# Patient Record
Sex: FEMALE | Race: WHITE | NOT HISPANIC OR LATINO
[De-identification: names, ages, dates, MRNs, and addresses within clinical notes are randomized per-mention and may not be internally consistent; named-entity substitution may affect disease eponyms.]

---

## 2018-06-19 ENCOUNTER — RESULT REVIEW (OUTPATIENT)
Age: 58
End: 2018-06-19

## 2020-04-11 ENCOUNTER — INPATIENT (INPATIENT)
Facility: HOSPITAL | Age: 60
LOS: 2 days | Discharge: HOME | DRG: 175 | End: 2020-04-14
Attending: FAMILY MEDICINE | Admitting: HOSPITALIST
Payer: COMMERCIAL

## 2020-04-11 VITALS
DIASTOLIC BLOOD PRESSURE: 75 MMHG | TEMPERATURE: 99 F | RESPIRATION RATE: 24 BRPM | HEIGHT: 63 IN | OXYGEN SATURATION: 94 % | SYSTOLIC BLOOD PRESSURE: 146 MMHG | WEIGHT: 166.01 LBS | HEART RATE: 114 BPM

## 2020-04-11 DIAGNOSIS — I82.402 ACUTE EMBOLISM AND THROMBOSIS OF UNSPECIFIED DEEP VEINS OF LEFT LOWER EXTREMITY: ICD-10-CM

## 2020-04-11 LAB
ALBUMIN SERPL ELPH-MCNC: 3.3 G/DL — SIGNIFICANT CHANGE UP (ref 3.3–5.2)
ALP SERPL-CCNC: 84 U/L — SIGNIFICANT CHANGE UP (ref 40–120)
ALT FLD-CCNC: 30 U/L — SIGNIFICANT CHANGE UP
ANION GAP SERPL CALC-SCNC: 13 MMOL/L — SIGNIFICANT CHANGE UP (ref 5–17)
APTT BLD: 29.9 SEC — SIGNIFICANT CHANGE UP (ref 27.5–36.3)
AST SERPL-CCNC: 24 U/L — SIGNIFICANT CHANGE UP
BASOPHILS # BLD AUTO: 0.05 K/UL — SIGNIFICANT CHANGE UP (ref 0–0.2)
BASOPHILS NFR BLD AUTO: 0.4 % — SIGNIFICANT CHANGE UP (ref 0–2)
BILIRUB SERPL-MCNC: 0.5 MG/DL — SIGNIFICANT CHANGE UP (ref 0.4–2)
BUN SERPL-MCNC: 10 MG/DL — SIGNIFICANT CHANGE UP (ref 8–20)
CALCIUM SERPL-MCNC: 9 MG/DL — SIGNIFICANT CHANGE UP (ref 8.6–10.2)
CHLORIDE SERPL-SCNC: 98 MMOL/L — SIGNIFICANT CHANGE UP (ref 98–107)
CO2 SERPL-SCNC: 28 MMOL/L — SIGNIFICANT CHANGE UP (ref 22–29)
CREAT SERPL-MCNC: 0.74 MG/DL — SIGNIFICANT CHANGE UP (ref 0.5–1.3)
CRP SERPL-MCNC: 30.85 MG/DL — HIGH (ref 0–0.4)
D DIMER BLD IA.RAPID-MCNC: 3838 NG/ML DDU — HIGH
EOSINOPHIL # BLD AUTO: 0.09 K/UL — SIGNIFICANT CHANGE UP (ref 0–0.5)
EOSINOPHIL NFR BLD AUTO: 0.7 % — SIGNIFICANT CHANGE UP (ref 0–6)
FERRITIN SERPL-MCNC: 1109 NG/ML — HIGH (ref 15–150)
FIBRINOGEN PPP-MCNC: >1400 MG/DL — CRITICAL HIGH (ref 300–520)
GLUCOSE SERPL-MCNC: 98 MG/DL — SIGNIFICANT CHANGE UP (ref 70–99)
HCT VFR BLD CALC: 38.9 % — SIGNIFICANT CHANGE UP (ref 34.5–45)
HGB BLD-MCNC: 12.4 G/DL — SIGNIFICANT CHANGE UP (ref 11.5–15.5)
IMM GRANULOCYTES NFR BLD AUTO: 1.2 % — SIGNIFICANT CHANGE UP (ref 0–1.5)
INR BLD: 1.38 RATIO — HIGH (ref 0.88–1.16)
LDH SERPL L TO P-CCNC: 236 U/L — HIGH (ref 98–192)
LYMPHOCYTES # BLD AUTO: 1.2 K/UL — SIGNIFICANT CHANGE UP (ref 1–3.3)
LYMPHOCYTES # BLD AUTO: 9.3 % — LOW (ref 13–44)
MCHC RBC-ENTMCNC: 27.7 PG — SIGNIFICANT CHANGE UP (ref 27–34)
MCHC RBC-ENTMCNC: 31.9 GM/DL — LOW (ref 32–36)
MCV RBC AUTO: 86.8 FL — SIGNIFICANT CHANGE UP (ref 80–100)
MONOCYTES # BLD AUTO: 1.33 K/UL — HIGH (ref 0–0.9)
MONOCYTES NFR BLD AUTO: 10.3 % — SIGNIFICANT CHANGE UP (ref 2–14)
NEUTROPHILS # BLD AUTO: 10.12 K/UL — HIGH (ref 1.8–7.4)
NEUTROPHILS NFR BLD AUTO: 78.1 % — HIGH (ref 43–77)
NT-PROBNP SERPL-SCNC: 57 PG/ML — SIGNIFICANT CHANGE UP (ref 0–300)
PLATELET # BLD AUTO: 309 K/UL — SIGNIFICANT CHANGE UP (ref 150–400)
POTASSIUM SERPL-MCNC: 3.3 MMOL/L — LOW (ref 3.5–5.3)
POTASSIUM SERPL-SCNC: 3.3 MMOL/L — LOW (ref 3.5–5.3)
PROCALCITONIN SERPL-MCNC: 0.1 NG/ML — SIGNIFICANT CHANGE UP (ref 0.02–0.1)
PROT SERPL-MCNC: 7.3 G/DL — SIGNIFICANT CHANGE UP (ref 6.6–8.7)
PROTHROM AB SERPL-ACNC: 15.7 SEC — HIGH (ref 10–12.9)
RBC # BLD: 4.48 M/UL — SIGNIFICANT CHANGE UP (ref 3.8–5.2)
RBC # FLD: 13.2 % — SIGNIFICANT CHANGE UP (ref 10.3–14.5)
SODIUM SERPL-SCNC: 139 MMOL/L — SIGNIFICANT CHANGE UP (ref 135–145)
TROPONIN T SERPL-MCNC: <0.01 NG/ML — SIGNIFICANT CHANGE UP (ref 0–0.06)
WBC # BLD: 12.94 K/UL — HIGH (ref 3.8–10.5)
WBC # FLD AUTO: 12.94 K/UL — HIGH (ref 3.8–10.5)

## 2020-04-11 PROCEDURE — 99285 EMERGENCY DEPT VISIT HI MDM: CPT

## 2020-04-11 PROCEDURE — 99223 1ST HOSP IP/OBS HIGH 75: CPT

## 2020-04-11 PROCEDURE — 71045 X-RAY EXAM CHEST 1 VIEW: CPT | Mod: 26

## 2020-04-11 PROCEDURE — 71275 CT ANGIOGRAPHY CHEST: CPT | Mod: 26

## 2020-04-11 PROCEDURE — 93010 ELECTROCARDIOGRAM REPORT: CPT

## 2020-04-11 RX ORDER — HEPARIN SODIUM 5000 [USP'U]/ML
6000 INJECTION INTRAVENOUS; SUBCUTANEOUS ONCE
Refills: 0 | Status: DISCONTINUED | OUTPATIENT
Start: 2020-04-11 | End: 2020-04-11

## 2020-04-11 RX ORDER — HEPARIN SODIUM 5000 [USP'U]/ML
3000 INJECTION INTRAVENOUS; SUBCUTANEOUS EVERY 6 HOURS
Refills: 0 | Status: DISCONTINUED | OUTPATIENT
Start: 2020-04-11 | End: 2020-04-13

## 2020-04-11 RX ORDER — HEPARIN SODIUM 5000 [USP'U]/ML
3000 INJECTION INTRAVENOUS; SUBCUTANEOUS EVERY 6 HOURS
Refills: 0 | Status: DISCONTINUED | OUTPATIENT
Start: 2020-04-11 | End: 2020-04-11

## 2020-04-11 RX ORDER — HEPARIN SODIUM 5000 [USP'U]/ML
INJECTION INTRAVENOUS; SUBCUTANEOUS
Qty: 25000 | Refills: 0 | Status: DISCONTINUED | OUTPATIENT
Start: 2020-04-11 | End: 2020-04-13

## 2020-04-11 RX ORDER — HEPARIN SODIUM 5000 [USP'U]/ML
6500 INJECTION INTRAVENOUS; SUBCUTANEOUS ONCE
Refills: 0 | Status: COMPLETED | OUTPATIENT
Start: 2020-04-11 | End: 2020-04-11

## 2020-04-11 RX ORDER — HEPARIN SODIUM 5000 [USP'U]/ML
6500 INJECTION INTRAVENOUS; SUBCUTANEOUS EVERY 6 HOURS
Refills: 0 | Status: DISCONTINUED | OUTPATIENT
Start: 2020-04-11 | End: 2020-04-13

## 2020-04-11 RX ORDER — HEPARIN SODIUM 5000 [USP'U]/ML
6000 INJECTION INTRAVENOUS; SUBCUTANEOUS EVERY 6 HOURS
Refills: 0 | Status: DISCONTINUED | OUTPATIENT
Start: 2020-04-11 | End: 2020-04-11

## 2020-04-11 RX ORDER — HEPARIN SODIUM 5000 [USP'U]/ML
INJECTION INTRAVENOUS; SUBCUTANEOUS
Qty: 25000 | Refills: 0 | Status: DISCONTINUED | OUTPATIENT
Start: 2020-04-11 | End: 2020-04-11

## 2020-04-11 RX ADMIN — HEPARIN SODIUM 6500 UNIT(S): 5000 INJECTION INTRAVENOUS; SUBCUTANEOUS at 22:21

## 2020-04-11 RX ADMIN — HEPARIN SODIUM 1400 UNIT(S)/HR: 5000 INJECTION INTRAVENOUS; SUBCUTANEOUS at 22:22

## 2020-04-11 NOTE — ED ADULT NURSE NOTE - NSIMPLEMENTINTERV_GEN_ALL_ED
Implemented All Fall Risk Interventions:  [place] to call system. Call bell, personal items and telephone within reach. Instruct patient to call for assistance. Room bathroom lighting operational. Non-slip footwear when patient is off stretcher. Physically safe environment: no spills, clutter or unnecessary equipment. Stretcher in lowest position, wheels locked, appropriate side rails in place. Provide visual cue, wrist band, yellow gown, etc. Monitor gait and stability. Monitor for mental status changes and reorient to person, place, and time. Review medications for side effects contributing to fall risk. Reinforce activity limits and safety measures with patient and family.

## 2020-04-11 NOTE — ED PROVIDER NOTE - OBJECTIVE STATEMENT
61 yo female pmh exercise induced asthma comes to ed with 2 week history of cough ,fever and shortness of breath; pt noted last week with low oxygen saturation txed with zithromax, nebs, and steroids; pt went to clinic with pain in left lower leg noted to be positive for dvt; covid swab pending

## 2020-04-11 NOTE — ED ADULT NURSE NOTE - OBJECTIVE STATEMENT
pt reports went to urgent care today, due to today being her 20th day of flu-like symptoms, trouble breathing, fevers max T 103.3 yesterday, cough, body aches and chills. at urgent care had CXR showed bilat PNA, also had ultrasound at [facility] due to pain and swelling to left lower leg, showing left leg DVT. was swabbed for covid today.  currently with same symptoms, son experienced similar symptoms a month ago.

## 2020-04-11 NOTE — ED PROVIDER NOTE - CARE PLAN
Principal Discharge DX:	Acute deep vein thrombosis (DVT) of left lower extremity, unspecified vein Principal Discharge DX:	Acute deep vein thrombosis (DVT) of left lower extremity, unspecified vein  Secondary Diagnosis:	Other pulmonary embolism without acute cor pulmonale, unspecified chronicity

## 2020-04-11 NOTE — ED ADULT TRIAGE NOTE - CHIEF COMPLAINT QUOTE
c/o  left leg dvt, also has a cough, fever,  had corona test today, had chest xray ,. pt was on 2 rounds of z pack not better

## 2020-04-12 DIAGNOSIS — I26.99 OTHER PULMONARY EMBOLISM WITHOUT ACUTE COR PULMONALE: ICD-10-CM

## 2020-04-12 LAB
APPEARANCE UR: CLEAR — SIGNIFICANT CHANGE UP
APTT BLD: 54.8 SEC — HIGH (ref 27.5–36.3)
APTT BLD: 70.9 SEC — HIGH (ref 27.5–36.3)
APTT BLD: 73.5 SEC — HIGH (ref 27.5–36.3)
BACTERIA # UR AUTO: ABNORMAL
BASOPHILS # BLD AUTO: 0.05 K/UL — SIGNIFICANT CHANGE UP (ref 0–0.2)
BASOPHILS NFR BLD AUTO: 0.4 % — SIGNIFICANT CHANGE UP (ref 0–2)
BILIRUB UR-MCNC: NEGATIVE — SIGNIFICANT CHANGE UP
CHOLEST SERPL-MCNC: 124 MG/DL — SIGNIFICANT CHANGE UP (ref 110–199)
CK SERPL-CCNC: 52 U/L — SIGNIFICANT CHANGE UP (ref 25–170)
COLOR SPEC: YELLOW — SIGNIFICANT CHANGE UP
DIFF PNL FLD: NEGATIVE — SIGNIFICANT CHANGE UP
EOSINOPHIL # BLD AUTO: 0.18 K/UL — SIGNIFICANT CHANGE UP (ref 0–0.5)
EOSINOPHIL NFR BLD AUTO: 1.5 % — SIGNIFICANT CHANGE UP (ref 0–6)
EPI CELLS # UR: ABNORMAL
GLUCOSE UR QL: NEGATIVE MG/DL — SIGNIFICANT CHANGE UP
HBA1C BLD-MCNC: 6.2 % — HIGH (ref 4–5.6)
HCT VFR BLD CALC: 35.8 % — SIGNIFICANT CHANGE UP (ref 34.5–45)
HDLC SERPL-MCNC: 43 MG/DL — LOW
HGB BLD-MCNC: 11.3 G/DL — LOW (ref 11.5–15.5)
IMM GRANULOCYTES NFR BLD AUTO: 1.5 % — SIGNIFICANT CHANGE UP (ref 0–1.5)
KETONES UR-MCNC: NEGATIVE — SIGNIFICANT CHANGE UP
LEUKOCYTE ESTERASE UR-ACNC: ABNORMAL
LIPID PNL WITH DIRECT LDL SERPL: 56 MG/DL — SIGNIFICANT CHANGE UP
LYMPHOCYTES # BLD AUTO: 1.93 K/UL — SIGNIFICANT CHANGE UP (ref 1–3.3)
LYMPHOCYTES # BLD AUTO: 16.4 % — SIGNIFICANT CHANGE UP (ref 13–44)
MAGNESIUM SERPL-MCNC: 2.3 MG/DL — SIGNIFICANT CHANGE UP (ref 1.6–2.6)
MCHC RBC-ENTMCNC: 27.1 PG — SIGNIFICANT CHANGE UP (ref 27–34)
MCHC RBC-ENTMCNC: 31.6 GM/DL — LOW (ref 32–36)
MCV RBC AUTO: 85.9 FL — SIGNIFICANT CHANGE UP (ref 80–100)
MONOCYTES # BLD AUTO: 1.5 K/UL — HIGH (ref 0–0.9)
MONOCYTES NFR BLD AUTO: 12.8 % — SIGNIFICANT CHANGE UP (ref 2–14)
NEUTROPHILS # BLD AUTO: 7.9 K/UL — HIGH (ref 1.8–7.4)
NEUTROPHILS NFR BLD AUTO: 67.4 % — SIGNIFICANT CHANGE UP (ref 43–77)
NITRITE UR-MCNC: NEGATIVE — SIGNIFICANT CHANGE UP
NT-PROBNP SERPL-SCNC: 66 PG/ML — SIGNIFICANT CHANGE UP (ref 0–300)
PH UR: 6 — SIGNIFICANT CHANGE UP (ref 5–8)
PHOSPHATE SERPL-MCNC: 3.8 MG/DL — SIGNIFICANT CHANGE UP (ref 2.4–4.7)
PLATELET # BLD AUTO: 339 K/UL — SIGNIFICANT CHANGE UP (ref 150–400)
PROT UR-MCNC: 30 MG/DL
RBC # BLD: 4.17 M/UL — SIGNIFICANT CHANGE UP (ref 3.8–5.2)
RBC # FLD: 13.3 % — SIGNIFICANT CHANGE UP (ref 10.3–14.5)
RBC CASTS # UR COMP ASSIST: SIGNIFICANT CHANGE UP /HPF (ref 0–4)
SARS-COV-2 RNA SPEC QL NAA+PROBE: SIGNIFICANT CHANGE UP
SP GR SPEC: 1.01 — SIGNIFICANT CHANGE UP (ref 1.01–1.02)
TOTAL CHOLESTEROL/HDL RATIO MEASUREMENT: 3 RATIO — LOW (ref 3.3–7.1)
TRIGL SERPL-MCNC: 126 MG/DL — SIGNIFICANT CHANGE UP (ref 10–200)
TROPONIN T SERPL-MCNC: <0.01 NG/ML — SIGNIFICANT CHANGE UP (ref 0–0.06)
TROPONIN T SERPL-MCNC: <0.01 NG/ML — SIGNIFICANT CHANGE UP (ref 0–0.06)
TSH SERPL-MCNC: 0.68 UIU/ML — SIGNIFICANT CHANGE UP (ref 0.27–4.2)
UROBILINOGEN FLD QL: NEGATIVE MG/DL — SIGNIFICANT CHANGE UP
WBC # BLD: 11.74 K/UL — HIGH (ref 3.8–10.5)
WBC # FLD AUTO: 11.74 K/UL — HIGH (ref 3.8–10.5)
WBC UR QL: SIGNIFICANT CHANGE UP

## 2020-04-12 PROCEDURE — 93970 EXTREMITY STUDY: CPT | Mod: 26

## 2020-04-12 PROCEDURE — 12345: CPT | Mod: NC

## 2020-04-12 PROCEDURE — 93010 ELECTROCARDIOGRAM REPORT: CPT

## 2020-04-12 RX ORDER — ASCORBIC ACID 60 MG
500 TABLET,CHEWABLE ORAL THREE TIMES A DAY
Refills: 0 | Status: DISCONTINUED | OUTPATIENT
Start: 2020-04-12 | End: 2020-04-14

## 2020-04-12 RX ORDER — HYDROXYCHLOROQUINE SULFATE 200 MG
TABLET ORAL
Refills: 0 | Status: DISCONTINUED | OUTPATIENT
Start: 2020-04-12 | End: 2020-04-14

## 2020-04-12 RX ORDER — POTASSIUM CHLORIDE 20 MEQ
40 PACKET (EA) ORAL EVERY 4 HOURS
Refills: 0 | Status: COMPLETED | OUTPATIENT
Start: 2020-04-12 | End: 2020-04-12

## 2020-04-12 RX ORDER — ACETAMINOPHEN 500 MG
650 TABLET ORAL EVERY 6 HOURS
Refills: 0 | Status: DISCONTINUED | OUTPATIENT
Start: 2020-04-12 | End: 2020-04-14

## 2020-04-12 RX ORDER — ALBUTEROL 90 UG/1
3 AEROSOL, METERED ORAL
Qty: 0 | Refills: 0 | DISCHARGE

## 2020-04-12 RX ORDER — THIAMINE MONONITRATE (VIT B1) 100 MG
200 TABLET ORAL
Refills: 0 | Status: DISCONTINUED | OUTPATIENT
Start: 2020-04-12 | End: 2020-04-14

## 2020-04-12 RX ORDER — ALBUTEROL 90 UG/1
2 AEROSOL, METERED ORAL EVERY 6 HOURS
Refills: 0 | Status: DISCONTINUED | OUTPATIENT
Start: 2020-04-12 | End: 2020-04-14

## 2020-04-12 RX ORDER — HYDROXYCHLOROQUINE SULFATE 200 MG
400 TABLET ORAL EVERY 24 HOURS
Refills: 0 | Status: DISCONTINUED | OUTPATIENT
Start: 2020-04-13 | End: 2020-04-14

## 2020-04-12 RX ORDER — HYDROXYCHLOROQUINE SULFATE 200 MG
800 TABLET ORAL EVERY 24 HOURS
Refills: 0 | Status: COMPLETED | OUTPATIENT
Start: 2020-04-12 | End: 2020-04-12

## 2020-04-12 RX ADMIN — HEPARIN SODIUM 1600 UNIT(S)/HR: 5000 INJECTION INTRAVENOUS; SUBCUTANEOUS at 21:20

## 2020-04-12 RX ADMIN — Medication 40 MILLIEQUIVALENT(S): at 02:24

## 2020-04-12 RX ADMIN — Medication 500 MILLIGRAM(S): at 13:51

## 2020-04-12 RX ADMIN — Medication 100 MILLIGRAM(S): at 05:40

## 2020-04-12 RX ADMIN — Medication 650 MILLIGRAM(S): at 05:40

## 2020-04-12 RX ADMIN — HEPARIN SODIUM 3000 UNIT(S): 5000 INJECTION INTRAVENOUS; SUBCUTANEOUS at 06:48

## 2020-04-12 RX ADMIN — Medication 500 MILLIGRAM(S): at 21:59

## 2020-04-12 RX ADMIN — Medication 100 MILLIGRAM(S): at 13:51

## 2020-04-12 RX ADMIN — Medication 100 MILLIGRAM(S): at 21:59

## 2020-04-12 RX ADMIN — Medication 800 MILLIGRAM(S): at 02:24

## 2020-04-12 RX ADMIN — Medication 40 MILLIEQUIVALENT(S): at 05:41

## 2020-04-12 RX ADMIN — Medication 100 MILLIGRAM(S): at 19:03

## 2020-04-12 RX ADMIN — HEPARIN SODIUM 1600 UNIT(S)/HR: 5000 INJECTION INTRAVENOUS; SUBCUTANEOUS at 06:47

## 2020-04-12 RX ADMIN — Medication 40 MILLIGRAM(S): at 19:02

## 2020-04-12 RX ADMIN — Medication 40 MILLIGRAM(S): at 05:40

## 2020-04-12 NOTE — H&P ADULT - REASON FOR ADMISSION
Acute Hypoxemic Respiratory Failure 2/2 suspected COVID19 PNA and multiple acute bilateral PEs and acute LLE DVT

## 2020-04-12 NOTE — ED ADULT NURSE REASSESSMENT NOTE - NS ED NURSE REASSESS COMMENT FT1
Report received from off going RN, pt received sleeping on stretcher in semi fowlers position, IV heparin gtt @ 1600 units/hr  infusing via alaris pump. per repeated labs at 6am, pt denies any c/o sob or chest pain  arousable to voice. v.s.s..

## 2020-04-12 NOTE — ED ADULT NURSE REASSESSMENT NOTE - NS ED NURSE REASSESS COMMENT FT1
NO lab present to draw repeat ptt, labs drawn by RN and sent. pt ria it well. pt ria lunch tray well. denies any chest pain or sob, IV heparin gtt continues per protocol. voiding via bedpan without difficulty, pericare provided. pt ria it well.

## 2020-04-12 NOTE — PROGRESS NOTE ADULT - ASSESSMENT
Acute Hypoxemic Respiratory Failure 2/2 multiple Acute Bilateral PE (s) s and COVID19 PNA  -cardiac enzymes negative   -trend D-Dimers/Trops/CPKs/CRP/ESR, elevated, monitor every 48-72 hrs for Acute PE/COVID PNA  -repeat EKG to monitor QTc and RV strain; -continue O2 via NC titrate FIO2 >92% and <96%  -Methylprednisolone 40mg IV q12 for COVID  -s/p Azithromycin completed x2 as outpatient  -Hydroxychloroquine 800mg x1 then 400mg q24 per protocol  -EKG Sinus Tachcyardia with QTc 433 no RV strain; repeat EKG QTc<500  -CTA findings as above. No RV strain.  -Check BL LE Venous Duplex to evaluate clot burden.  -2dEcho for RV; pending  -Continue Heparin bolus/gtt per protocol  -Defer AC to Pulm. Likely switch to NOAC in AM, agree with NOAC, will require NOAC for 6 months -Pt has out of state insurance  -continue heparin drip for now, monitor O2 need/tachycardia  -recommended f/u duplex in 3 months; CT chest in 6 wks    LLE DVT  -outpatient positive Venous duplex 4/11 at [facility] only LLE performed  -check BL LE Venous Dopplers inpatient to assess clot burden  -Consider Vascular Surgery consult for clot retrieval as pt continues to experience pain with ambulation  -will need outpt work up for thrombophilia    Hypokalemia  -KCL 40meq PO q4 x2. Repeat labs. Check Mg/Phos level    Exercise-Induced Asthma  -Albuterol MDI PRN    Incidental L Adrenal Gland lesion on CTA  -outpatient F/u for MRI Abd/Endocrinology     VTE  -Heparin drip     Dispo:  -Home     Follow up:  PCP, Hematology, Endocrinology    Code Status:  Full Code     (Lalo Perez) contacted and updated concerning pt status and plan of care. Attest understanding, agree with plan. All questions answered.

## 2020-04-12 NOTE — PROGRESS NOTE ADULT - SUBJECTIVE AND OBJECTIVE BOX
Patient is a 60y old  Female who presents with a chief complaint of Acute Hypoxemic Respiratory Failure 2/2 suspected COVID19 PNA and multiple acute bilateral PEs and acute LLE DVT (2020 11:27)    INTERVAL HPI/OVERNIGHT EVENTS:  60y  Female  Admitted overnight due to DVT/PEs. Currently on heparin drip and O2.   Pt is tolerating PO, voiding and stooling spontaneously. Resting in bed. Pain well tolerated under current regiment.  ROS Negative except as above.    Vital Signs Last 24 Hrs  T(C): 36.9 (2020 16:34), Max: 37.7 (2020 18:22)  T(F): 98.4 (2020 16:34), Max: 99.9 (2020 18:22)  HR: 82 (2020 16:34) (82 - 103)  BP: 101/66 (2020 16:34) (94/52 - 129/64)  RR: 22 (2020 16:34) (17 - 24)  SpO2: 95% (2020 16:34) (88% - 97%)    Telemetry: 95% O2 sat on NC 2lts    PHYSICAL EXAM:  General: NAD, well nourished and well developed, afebrile, on NC  HEENT: Normocephalic, atraumatic, clear sclera, PERRLA, EOMI, Clear nares, Moist Mucosas  Neck: Supple, no JVD  Respiratory: Normal respiratory rate and effort, lungs are clear to auscultation bilaterally.  Cardiac: Regular rate and rhythm, no murmurs, rubs or gallop.  GI: Abdomen is soft, nontender, nondistended, no rebound or guarding, bowel sounds are normal.  Extremities: No cyanosis, no edema, pedal pulses +2 bilaterally. Andrea Sign negative.  Neurological: Patient is alert and oriented x4, CN II-XII grossly intact, no sensory or motor deficits.  Psych: Normal speech and affect, good eye contact. No behavioural disturbances.    LABS:             11.3   11. )-----------( 339      ( 2020 05:03 )             35.8     CBC Full  -  ( 2020 05:03 )  WBC Count : 11.74 K/uL  RBC Count : 4.17 M/uL  Hemoglobin : 11.3 g/dL  Hematocrit : 35.8 %  Platelet Count - Automated : 339 K/uL  Mean Cell Volume : 85.9 fl  Mean Cell Hemoglobin : 27.1 pg  Mean Cell Hemoglobin Concentration : 31.6 gm/dL  Auto Neutrophil # : 7.90 K/uL  Auto Lymphocyte # : 1.93 K/uL  Auto Monocyte # : 1.50 K/uL  Auto Eosinophil # : 0.18 K/uL  Auto Basophil # : 0.05 K/uL  Auto Neutrophil % : 67.4 %  Auto Lymphocyte % : 16.4 %  Auto Monocyte % : 12.8 %  Auto Eosinophil % : 1.5 %  Auto Basophil % : 0.4 %      139  |  98  |  10.0  ----------------------------<  98  3.3<L>   |  28.0  |  0.74    Ca    9.0      2020 16:10  Phos  3.8       Mg     2.3     12    TPro  7.3  /  Alb  3.3  /  TBili  0.5  /  DBili  x   /  AST  24  /  ALT  30  /  AlkPhos  84  04-11      PT/INR - ( 2020 16:10 )   PT: 15.7 sec;   INR: 1.38 ratio    PTT - ( 2020 14:04 )  PTT:73.5 sec    Urinalysis Basic - ( 2020 17:02 )  Color: Yellow / Appearance: Clear / S.015 / pH: x  Gluc: x / Ketone: Negative  / Bili: Negative / Urobili: Negative mg/dL   Blood: x / Protein: 30 mg/dL / Nitrite: Negative   Leuk Esterase: Trace / RBC: x / WBC x   Sq Epi: x / Non Sq Epi: x / Bacteria: x    Hemoglobin A1C, Whole Blood: 6.2 % ( @ 05:03)    Serum Pro-Brain Natriuretic Peptide: 66 pg/mL ( @ 07:03)  Procalcitonin, Serum: 0.10 ng/mL ( @ 16:10)  Serum Pro-Brain Natriuretic Peptide: 57 pg/mL ( @ 16:10)    < from: US Duplex Venous Lower Ext Complete, Bilateral (20 @ 07:46) >  FINDINGS:  There is normal compressibility of the right common femoral, femoral and popliteal veins.   Doppler examination shows normal spontaneous and phasic flow.  No right calf vein thrombosis is detected.  Thrombus noted throughout the deep veins of the left lower extremity.    IMPRESSION:   Extensive deep venous thrombosis throughout the left lower extremity.    CONSTANCE GARCIA M.D., ATTENDING RADIOLOGIST  This document has been electronically signed. 2020  8:08AM  < end of copied text >    < from: CT Angio Chest w/ IV Cont (20 @ 19:13) >  FINDINGS:  LUNGS AND AIRWAYS: Patent central airways.  There is patchy groundglass opacity and areas of more dense lung consolidation bilaterally, which has a predominant peripheral distribution..  PLEURA: No pleural effusion.  MEDIASTINUM AND YULISA: No lymphadenopathy.  VESSELS: There is lobar, segmental and subsegmental pulmonary emboli in the right upper, right middle and right lower lobes.  There are segmental and subsegmental pulmonary in the left upper lobe and lingula.  HEART: Heart size is normal.  No dilatation of the right ventricle or flattening of the interventricular septum. No pericardial effusion.  CHEST WALL AND LOWER NECK: Within normal limits.  VISUALIZED UPPER ABDOMEN: There is approximately 2.5 x 2.4 cm indeterminate lesion in the left adrenal gland.  BONES: Within normal limits.    IMPRESSION:   Acute pulmonary emboli bilaterally.  No evidence of right heart strain.  Patchy peripheral opacities in both lungs compatible, compatible with COVID-19 pneumonia.  Approximately 2.5 x 2.4 cm indeterminant left adrenal gland lesion.  If this has not been characterized, abdominal MRI should be obtained on a nonemergent basis.    CRITICAL VALUE: I discussed the major findings in this report with  Dr. Mcleod on 2020 at 7:40 PM.  Critical value policy of the hospital was followed. Read back and confirmation of receipt of this communication was performed. This verbal communication supplements the text report of this document.    HYACINTH WAYNE M.D.,ATTENDING RADIOLOG  < end of copied text >    MEDICATIONS  (STANDING):  ascorbic acid 500 milliGRAM(s) Oral three times a day  benzonatate 100 milliGRAM(s) Oral three times a day  heparin  Infusion.  Unit(s)/Hr (14 mL/Hr) IV Continuous <Continuous>  hydroxychloroquine   Oral   methylPREDNISolone sodium succinate Injectable 40 milliGRAM(s) IV Push every 12 hours  thiamine 200 milliGRAM(s) Oral <User Schedule>    MEDICATIONS  (PRN):  acetaminophen    Suspension .. 650 milliGRAM(s) Oral every 6 hours PRN Temp greater or equal to 38C (100.4F), Mild Pain (1 - 3), Moderate Pain (4 - 6), Severe Pain (7 - 10)  ALBUTerol    90 MICROgram(s) HFA Inhaler 2 Puff(s) Inhalation every 6 hours PRN Shortness of Breath and/or Wheezing  guaiFENesin  milliGRAM(s) Oral every 12 hours PRN Cough  heparin  Injectable 6500 Unit(s) IV Push every 6 hours PRN For aPTT less than 40  heparin  Injectable 3000 Unit(s) IV Push every 6 hours PRN For aPTT between 40 - 57 Patient is a 60y old  Female who presents with a chief complaint of Acute Hypoxemic Respiratory Failure 2/2 suspected COVID19 PNA and multiple acute bilateral PEs and acute LLE DVT (2020 11:27)    INTERVAL HPI/OVERNIGHT EVENTS:  60y  Female  Admitted overnight due to DVT/PEs. Currently on heparin drip and O2.   Pt is tolerating PO, voiding and stooling spontaneously. Resting in bed. Pain well tolerated under current regiment.  ROS Negative except as above.    Vital Signs Last 24 Hrs  T(C): 36.9 (2020 16:34), Max: 37.7 (2020 18:22)  T(F): 98.4 (2020 16:34), Max: 99.9 (2020 18:22)  HR: 82 (2020 16:34) (82 - 103)  BP: 101/66 (2020 16:34) (94/52 - 129/64)  RR: 22 (2020 16:34) (17 - 24)  SpO2: 95% (2020 16:34) (88% - 97%)    Telemetry: 95% O2 sat on NC 2lts    PHYSICAL EXAM:  General: NAD, well nourished and well developed, afebrile, on NC  HEENT: Normocephalic, atraumatic, clear sclera, PERRLA, EOMI, Clear nares, Moist Mucosas  Neck: Supple, no JVD  Respiratory: Normal respiratory rate and effort, lungs are clear to auscultation bilaterally.  Cardiac: Regular rate and rhythm, no murmurs, rubs or gallop.  GI: Abdomen is soft, nontender, nondistended, no rebound or guarding, bowel sounds are normal.  Extremities: No cyanosis, no edema, pedal pulses +2 bilaterally. Andrea Sign negative.  Neurological: Patient is alert and oriented x4, CN II-XII grossly intact, no sensory or motor deficits.  Psych: Normal speech and affect, good eye contact. No behavioural disturbances.    LABS:             11.3   11. )-----------( 339      ( 2020 05:03 )             35.8     CBC Full  -  ( 2020 05:03 )  WBC Count : 11.74 K/uL  RBC Count : 4.17 M/uL  Hemoglobin : 11.3 g/dL  Hematocrit : 35.8 %  Platelet Count - Automated : 339 K/uL  Mean Cell Volume : 85.9 fl  Mean Cell Hemoglobin : 27.1 pg  Mean Cell Hemoglobin Concentration : 31.6 gm/dL  Auto Neutrophil # : 7.90 K/uL  Auto Lymphocyte # : 1.93 K/uL  Auto Monocyte # : 1.50 K/uL  Auto Eosinophil # : 0.18 K/uL  Auto Basophil # : 0.05 K/uL  Auto Neutrophil % : 67.4 %  Auto Lymphocyte % : 16.4 %  Auto Monocyte % : 12.8 %  Auto Eosinophil % : 1.5 %  Auto Basophil % : 0.4 %      139  |  98  |  10.0  ----------------------------<  98  3.3<L>   |  28.0  |  0.74    Ca    9.0      2020 16:10  Phos  3.8       Mg     2.3     12    TPro  7.3  /  Alb  3.3  /  TBili  0.5  /  DBili  x   /  AST  24  /  ALT  30  /  AlkPhos  84  04-11      PT/INR - ( 2020 16:10 )   PT: 15.7 sec;   INR: 1.38 ratio    PTT - ( 2020 14:04 )  PTT:73.5 sec    Urinalysis Basic - ( 2020 17:02 )  Color: Yellow / Appearance: Clear / S.015 / pH: x  Gluc: x / Ketone: Negative  / Bili: Negative / Urobili: Negative mg/dL   Blood: x / Protein: 30 mg/dL / Nitrite: Negative   Leuk Esterase: Trace / RBC: x / WBC x   Sq Epi: x / Non Sq Epi: x / Bacteria: x    Hemoglobin A1C, Whole Blood: 6.2 % ( @ 05:03)    Serum Pro-Brain Natriuretic Peptide: 66 pg/mL ( @ 07:03)  Procalcitonin, Serum: 0.10 ng/mL ( @ 16:10)  Serum Pro-Brain Natriuretic Peptide: 57 pg/mL ( @ 16:10)    < from: 12 Lead ECG (20 @ 13:19) >  Ventricular Rate 83 BPM  Atrial Rate 83 BPM  P-R Interval 164 ms  QRS Duration 80 ms  Q-T Interval 404 ms  QTC Calculation(Bezet) 474 ms  P Axis 53 degrees  R Axis 38 degrees  T Axis 34 degrees    Diagnosis Line Normal sinus rhythm  Cannot rule out Anterior infarct , age undetermined  Abnormal ECG    Confirmed by PETER MCFADDEN ([id number]) on 2020 1:49:28 PM  < end of copied text >    < from: US Duplex Venous Lower Ext Complete, Bilateral (20 @ 07:46) >  FINDINGS:  There is normal compressibility of the right common femoral, femoral and popliteal veins.   Doppler examination shows normal spontaneous and phasic flow.  No right calf vein thrombosis is detected.  Thrombus noted throughout the deep veins of the left lower extremity.    IMPRESSION:   Extensive deep venous thrombosis throughout the left lower extremity.    CONSTANCE GARCIA M.D., ATTENDING RADIOLOGIST  This document has been electronically signed. 2020  8:08AM  < end of copied text >    < from: CT Angio Chest w/ IV Cont (20 @ 19:13) >  FINDINGS:  LUNGS AND AIRWAYS: Patent central airways.  There is patchy groundglass opacity and areas of more dense lung consolidation bilaterally, which has a predominant peripheral distribution..  PLEURA: No pleural effusion.  MEDIASTINUM AND YULISA: No lymphadenopathy.  VESSELS: There is lobar, segmental and subsegmental pulmonary emboli in the right upper, right middle and right lower lobes.  There are segmental and subsegmental pulmonary in the left upper lobe and lingula.  HEART: Heart size is normal.  No dilatation of the right ventricle or flattening of the interventricular septum. No pericardial effusion.  CHEST WALL AND LOWER NECK: Within normal limits.  VISUALIZED UPPER ABDOMEN: There is approximately 2.5 x 2.4 cm indeterminate lesion in the left adrenal gland.  BONES: Within normal limits.    IMPRESSION:   Acute pulmonary emboli bilaterally.  No evidence of right heart strain.  Patchy peripheral opacities in both lungs compatible, compatible with COVID-19 pneumonia.  Approximately 2.5 x 2.4 cm indeterminant left adrenal gland lesion.  If this has not been characterized, abdominal MRI should be obtained on a nonemergent basis.    CRITICAL VALUE: I discussed the major findings in this report with  Dr. Mcleod on 2020 at 7:40 PM.  Critical value policy of the hospital was followed. Read back and confirmation of receipt of this communication was performed. This verbal communication supplements the text report of this document.    HYACINTH WAYNE M.D.,ATTENDING RADIOLOG  < end of copied text >    MEDICATIONS  (STANDING):  ascorbic acid 500 milliGRAM(s) Oral three times a day  benzonatate 100 milliGRAM(s) Oral three times a day  heparin  Infusion.  Unit(s)/Hr (14 mL/Hr) IV Continuous <Continuous>  hydroxychloroquine   Oral   methylPREDNISolone sodium succinate Injectable 40 milliGRAM(s) IV Push every 12 hours  thiamine 200 milliGRAM(s) Oral <User Schedule>    MEDICATIONS  (PRN):  acetaminophen    Suspension .. 650 milliGRAM(s) Oral every 6 hours PRN Temp greater or equal to 38C (100.4F), Mild Pain (1 - 3), Moderate Pain (4 - 6), Severe Pain (7 - 10)  ALBUTerol    90 MICROgram(s) HFA Inhaler 2 Puff(s) Inhalation every 6 hours PRN Shortness of Breath and/or Wheezing  guaiFENesin  milliGRAM(s) Oral every 12 hours PRN Cough  heparin  Injectable 6500 Unit(s) IV Push every 6 hours PRN For aPTT less than 40  heparin  Injectable 3000 Unit(s) IV Push every 6 hours PRN For aPTT between 40 - 57

## 2020-04-12 NOTE — H&P ADULT - NSICDXFAMHXPERTINENTNEGATIVE_GEN_A_CORE_FT
Denies family history blood clots/malignancy except for daughter -  age 30 from acute PE post-operatively.

## 2020-04-12 NOTE — H&P ADULT - ASSESSMENT
Acute Hypoxemic Respiratory Failure 2/2 multiple Acute Bilateral PE (s) s and COVID19 PNA  -Admit to Medicine  -repeat labs. trend cardiac enzymes.   -trend D-Dimers/Trops/CPKs/CRP/ESR for Acute PE/COVID PNA  -repeat EKG to monitor QTc and RV strain  -continue O2 via NC titrate FIO2 >92% and <96%  -Methylprednisolone 40mg IV q12 for COVID  -s/p Azithromycin completed x2 as outpatient  -Hydroxychloroquine 800mg x1 then 400mg q24 per protocol  -EKG Sinus Tachcyardia with QTc 433 no RV strain  -CTA findings as above. No RV strain.  -Check BL LE Venous Duplex to evaluate clot burden.  -2dEcho for RV  -Pulm Consult - LI Lung  -Continue Heparin bolus/gtt per protocol  -Defer AC to Pulm. Likely switch to NOAC in AM    LLE DVT  -outpatient positive Venous duplex 4/11 at [facility] only LLE performed  -check BL LE Venous Dopplers inpatient to assess clot burden  -Consider Vascular Surgery consult for clot retrieval as pt continues to experience pain with ambulation    Hypokalemia  -KCL 40meq PO q4 x2. Repeat labs. Check Mg/Phos level    Exercise-Induced Asthma  -Albuterol MDI PRN    Incidental L Adrenal Gland lesion on CTA  -outpatient F/u for MRI Abd/Endocrinology

## 2020-04-12 NOTE — CONSULT NOTE ADULT - SUBJECTIVE AND OBJECTIVE BOX
PULMONARY CONSULT NOTE      CAREN DISLAN-[id number]    Patient is a 60y old  Female who presents with a chief complaint of Acute Hypoxemic Respiratory Failure 2/2 suspected COVID19 PNA and multiple acute bilateral PEs and acute LLE DVT (12 Apr 2020 03:39)      INTERVAL HPI/OVERNIGHT EVENTS:59 y/o F with PMHX Exercise-induced Asthma c/o nonproductive cough, Fever/Chills, SOB/AC x2 weeks. She developed hypoxia last week and was rx'd Azithromycin, Albuterol Nebs, and steroids as outpatient. She developed LLE calf pain more recently and went to [facility] - had outpatient venous duplex doppler at [facility] with +DVT LLE. COVID19 screen pending. Pt with significant hypoxia and tachycardia on arrival. CXR with bilateral infiltrates. CTA Chest +Acute Bilateral PEs. +lobar/segmental/subsegmental PE in RUL/RML/RLL. +segmental/subsegmental PE in PARRIS/Lingula. No RV strain. +Peripheral GGO bilaterally. EKG without evidence of RV strain. Labs with D-Dimer almost 4000 and CRP >30. Started on Heparin gtt with bolus and currently on Heparin gtt. Currently afebrile but reported Tmax 103.3F yesterday. +Sick Contacts at home x2 (/son). No recent travel or extended car rides but admits to laying in bed entire day for past 2 weeks except for bathroom use twice a day due to ongoing hypoxia. ROS otherwise negative unless mentioned above.       MEDICATIONS  (STANDING):  ascorbic acid 500 milliGRAM(s) Oral three times a day  benzonatate 100 milliGRAM(s) Oral three times a day  heparin  Infusion.  Unit(s)/Hr (14 mL/Hr) IV Continuous <Continuous>  hydroxychloroquine   Oral   methylPREDNISolone sodium succinate Injectable 40 milliGRAM(s) IV Push every 12 hours  thiamine 200 milliGRAM(s) Oral <User Schedule>      MEDICATIONS  (PRN):  acetaminophen    Suspension .. 650 milliGRAM(s) Oral every 6 hours PRN Temp greater or equal to 38C (100.4F), Mild Pain (1 - 3), Moderate Pain (4 - 6), Severe Pain (7 - 10)  ALBUTerol    90 MICROgram(s) HFA Inhaler 2 Puff(s) Inhalation every 6 hours PRN Shortness of Breath and/or Wheezing  guaiFENesin  milliGRAM(s) Oral every 12 hours PRN Cough  heparin  Injectable 6500 Unit(s) IV Push every 6 hours PRN For aPTT less than 40  heparin  Injectable 3000 Unit(s) IV Push every 6 hours PRN For aPTT between 40 - 57      Allergies    penicillin (Unknown)    Intolerances        PAST MEDICAL & SURGICAL HISTORY:  Asthma  No significant past surgical history      FAMILY HISTORY:  No pertinent family history in first degree relatives      SOCIAL HISTORY  Smoking History:   never  REVIEW OF SYSTEMS:    CONSTITUTIONAL:  As per HPI.    HEENT:  Eyes:  No diplopia or blurred vision. ENT:  No earache, sore throat or runny nose.    CARDIOVASCULAR:  No pressure, squeezing, tightness, heaviness or aching about the chest; no palpitations.    RESPIRATORY:  Per HPI    GASTROINTESTINAL:  No nausea, vomiting or diarrhea.    GENITOURINARY:  No dysuria, frequency or urgency.    MUSCULOSKELETAL:  No joint pains    SKIN:  No new lesions.    NEUROLOGIC:  No paresthesias, fasciculations, seizures or weakness.    PSYCHIATRIC:  No disorder of thought or mood.    ENDOCRINE:  No heat or cold intolerance, polyuria or polydipsia.    HEMATOLOGICAL:  No easy bruising or bleeding.     Vital Signs Last 24 Hrs  T(C): 36.6 (12 Apr 2020 07:13), Max: 37.7 (11 Apr 2020 18:22)  T(F): 97.9 (12 Apr 2020 07:13), Max: 99.9 (11 Apr 2020 18:22)  HR: 90 (12 Apr 2020 07:13) (90 - 114)  BP: 94/52 (12 Apr 2020 07:13) (94/52 - 146/75)  BP(mean): --  RR: 17 (12 Apr 2020 07:13) (17 - 24)  SpO2: 97% (12 Apr 2020 07:13) (88% - 97%)    PHYSICAL EXAMINATION:      EXTREMITIES: Without any cyanosis, clubbing, rash, lesions or edema.  LLE without erythema or induration        LABS:                        11.3   11.74 )-----------( 339      ( 12 Apr 2020 05:03 )             35.8     04-11    139  |  98  |  10.0  ----------------------------<  98  3.3<L>   |  28.0  |  0.74    Ca    9.0      11 Apr 2020 16:10  Phos  3.8     04-12  Mg     2.3     04-12    TPro  7.3  /  Alb  3.3  /  TBili  0.5  /  DBili  x   /  AST  24  /  ALT  30  /  AlkPhos  84  04-11    PT/INR - ( 11 Apr 2020 16:10 )   PT: 15.7 sec;   INR: 1.38 ratio         PTT - ( 12 Apr 2020 05:03 )  PTT:54.8 sec      CARDIAC MARKERS ( 12 Apr 2020 05:03 )  x     / <0.01 ng/mL / x     / x     / x      CARDIAC MARKERS ( 12 Apr 2020 02:28 )  x     / <0.01 ng/mL / 52 U/L / x     / x      CARDIAC MARKERS ( 11 Apr 2020 16:10 )  x     / <0.01 ng/mL / x     / x     / x          D-Dimer Assay, Quantitative: 3838 ng/mL DDU (04-11-20 @ 16:10)    Serum Pro-Brain Natriuretic Peptide: 66 pg/mL (04-12-20 @ 07:03)  Serum Pro-Brain Natriuretic Peptide: 57 pg/mL (04-11-20 @ 16:10)      Procalcitonin, Serum: 0.10 ng/mL (04-11-20 @ 16:10)      MICROBIOLOGY:COVID-19 PCR . (04.11.20 @ 16:27)    COVID-19 PCR: NotDetec: This test has been validated by [employer] across global offices to be accurate;  though it has not been FDA cleared/approved by the usual pathway.  As with all laboratory tests, results should be correlated with clinical  findings.  https://www.fda.gov/media/201185/download  https://www.fda.gov/media/717883/download        RADIOLOGY & ADDITIONAL STUDIES:< from: US Duplex Venous Lower Ext Complete, Bilateral (04.12.20 @ 07:46) >    Extensive deep venous thrombosis throughout the left lower extremity.      < end of copied text >  < from: CT Angio Chest w/ IV Cont (04.11.20 @ 19:13) >  LUNGS AND AIRWAYS: Patent central airways.  There is patchy groundglass opacity and areas of more dense lung consolidation bilaterally, which has a predominant peripheral distribution..    PLEURA: No pleural effusion.    MEDIASTINUM AND YULISA: No lymphadenopathy.    VESSELS: There is lobar, segmental and subsegmental pulmonary emboli in the right upper, right middle and right lower lobes.  There are segmental and subsegmental pulmonary in the left upper lobe and lingula.    HEART: Heart size is normal.  No dilatation of the right ventricle or flattening of the interventricular septum. No pericardial effusion.    CHEST WALL AND LOWER NECK: Within normal limits.    VISUALIZED UPPER ABDOMEN: There is approximately 2.5 x 2.4 cm indeterminate lesion in the left adrenal gland.    BONES: Within normal limits.      < end of copied text >

## 2020-04-12 NOTE — CONSULT NOTE ADULT - ASSESSMENT
Imp--Pt with DVT/PE.  Jett periph infiltrates on CT ?infarcts.  COVID neg but pt with 3 week hx c/w COVID.  Suspect PE provoked by immobility from viral syndrome,hypercoagulability from COVID.  Currently sat 93-4 on 2l/min O2.  No lab or radiographic evidence Rt ht strain.  Plan--recommend change to NOAC.  will need at least 6 months.  F/U Duplex in 3 months  F/U CT chest in 6 weeks to follow infiltrates.

## 2020-04-12 NOTE — ED ADULT NURSE REASSESSMENT NOTE - NS ED NURSE REASSESS COMMENT FT1
PTT 73.5 no change in drip rate at this time, repeat lab ordered for 8pm. pt updated on plan of care.  azams.s

## 2020-04-13 ENCOUNTER — TRANSCRIPTION ENCOUNTER (OUTPATIENT)
Age: 60
End: 2020-04-13

## 2020-04-13 LAB
ALBUMIN SERPL ELPH-MCNC: 3.3 G/DL — SIGNIFICANT CHANGE UP (ref 3.3–5.2)
ALP SERPL-CCNC: 82 U/L — SIGNIFICANT CHANGE UP (ref 40–120)
ALT FLD-CCNC: 42 U/L — HIGH
ANION GAP SERPL CALC-SCNC: 16 MMOL/L — SIGNIFICANT CHANGE UP (ref 5–17)
APTT BLD: 81.1 SEC — HIGH (ref 27.5–36.3)
AST SERPL-CCNC: 29 U/L — SIGNIFICANT CHANGE UP
BILIRUB SERPL-MCNC: 0.2 MG/DL — LOW (ref 0.4–2)
BUN SERPL-MCNC: 16 MG/DL — SIGNIFICANT CHANGE UP (ref 8–20)
CALCIUM SERPL-MCNC: 9.3 MG/DL — SIGNIFICANT CHANGE UP (ref 8.6–10.2)
CHLORIDE SERPL-SCNC: 101 MMOL/L — SIGNIFICANT CHANGE UP (ref 98–107)
CK SERPL-CCNC: 51 U/L — SIGNIFICANT CHANGE UP (ref 25–170)
CO2 SERPL-SCNC: 23 MMOL/L — SIGNIFICANT CHANGE UP (ref 22–29)
CREAT SERPL-MCNC: 0.74 MG/DL — SIGNIFICANT CHANGE UP (ref 0.5–1.3)
CRP SERPL-MCNC: 15.94 MG/DL — HIGH (ref 0–0.4)
D DIMER BLD IA.RAPID-MCNC: 2650 NG/ML DDU — HIGH
ERYTHROCYTE [SEDIMENTATION RATE] IN BLOOD: 49 MM/HR — HIGH (ref 0–20)
FERRITIN SERPL-MCNC: 1315 NG/ML — HIGH (ref 15–150)
GLUCOSE SERPL-MCNC: 221 MG/DL — HIGH (ref 70–99)
HCT VFR BLD CALC: 37.1 % — SIGNIFICANT CHANGE UP (ref 34.5–45)
HCV AB S/CO SERPL IA: 0.86 S/CO — SIGNIFICANT CHANGE UP (ref 0–0.99)
HCV AB SERPL-IMP: SIGNIFICANT CHANGE UP
HGB BLD-MCNC: 11.9 G/DL — SIGNIFICANT CHANGE UP (ref 11.5–15.5)
INR BLD: 1.27 RATIO — HIGH (ref 0.88–1.16)
LDH SERPL L TO P-CCNC: 208 U/L — HIGH (ref 98–192)
MCHC RBC-ENTMCNC: 27.7 PG — SIGNIFICANT CHANGE UP (ref 27–34)
MCHC RBC-ENTMCNC: 32.1 GM/DL — SIGNIFICANT CHANGE UP (ref 32–36)
MCV RBC AUTO: 86.3 FL — SIGNIFICANT CHANGE UP (ref 80–100)
PLATELET # BLD AUTO: 370 K/UL — SIGNIFICANT CHANGE UP (ref 150–400)
POTASSIUM SERPL-MCNC: 3.8 MMOL/L — SIGNIFICANT CHANGE UP (ref 3.5–5.3)
POTASSIUM SERPL-SCNC: 3.8 MMOL/L — SIGNIFICANT CHANGE UP (ref 3.5–5.3)
PROCALCITONIN SERPL-MCNC: 0.07 NG/ML — SIGNIFICANT CHANGE UP (ref 0.02–0.1)
PROT SERPL-MCNC: 7.5 G/DL — SIGNIFICANT CHANGE UP (ref 6.6–8.7)
PROTHROM AB SERPL-ACNC: 14.4 SEC — HIGH (ref 10–12.9)
RBC # BLD: 4.3 M/UL — SIGNIFICANT CHANGE UP (ref 3.8–5.2)
RBC # FLD: 13.2 % — SIGNIFICANT CHANGE UP (ref 10.3–14.5)
SODIUM SERPL-SCNC: 140 MMOL/L — SIGNIFICANT CHANGE UP (ref 135–145)
TROPONIN T SERPL-MCNC: <0.01 NG/ML — SIGNIFICANT CHANGE UP (ref 0–0.06)
WBC # BLD: 14.19 K/UL — HIGH (ref 3.8–10.5)
WBC # FLD AUTO: 14.19 K/UL — HIGH (ref 3.8–10.5)

## 2020-04-13 PROCEDURE — 99233 SBSQ HOSP IP/OBS HIGH 50: CPT

## 2020-04-13 PROCEDURE — 93306 TTE W/DOPPLER COMPLETE: CPT | Mod: 26

## 2020-04-13 PROCEDURE — 99232 SBSQ HOSP IP/OBS MODERATE 35: CPT | Mod: GC

## 2020-04-13 RX ORDER — HYDROXYCHLOROQUINE SULFATE 200 MG
2 TABLET ORAL
Qty: 6 | Refills: 0
Start: 2020-04-13 | End: 2020-04-15

## 2020-04-13 RX ORDER — THIAMINE MONONITRATE (VIT B1) 100 MG
2 TABLET ORAL
Qty: 60 | Refills: 0
Start: 2020-04-13 | End: 2020-05-12

## 2020-04-13 RX ORDER — HEPARIN SODIUM 5000 [USP'U]/ML
1600 INJECTION INTRAVENOUS; SUBCUTANEOUS
Qty: 25000 | Refills: 0 | Status: DISCONTINUED | OUTPATIENT
Start: 2020-04-13 | End: 2020-04-13

## 2020-04-13 RX ORDER — ASCORBIC ACID 60 MG
1 TABLET,CHEWABLE ORAL
Qty: 90 | Refills: 0
Start: 2020-04-13 | End: 2020-05-12

## 2020-04-13 RX ORDER — APIXABAN 2.5 MG/1
10 TABLET, FILM COATED ORAL EVERY 12 HOURS
Refills: 0 | Status: DISCONTINUED | OUTPATIENT
Start: 2020-04-13 | End: 2020-04-14

## 2020-04-13 RX ORDER — APIXABAN 2.5 MG/1
2 TABLET, FILM COATED ORAL
Qty: 74 | Refills: 0
Start: 2020-04-13 | End: 2020-05-12

## 2020-04-13 RX ORDER — BUDESONIDE AND FORMOTEROL FUMARATE DIHYDRATE 160; 4.5 UG/1; UG/1
2 AEROSOL RESPIRATORY (INHALATION)
Refills: 0 | Status: DISCONTINUED | OUTPATIENT
Start: 2020-04-13 | End: 2020-04-14

## 2020-04-13 RX ADMIN — Medication 500 MILLIGRAM(S): at 22:44

## 2020-04-13 RX ADMIN — APIXABAN 10 MILLIGRAM(S): 2.5 TABLET, FILM COATED ORAL at 16:24

## 2020-04-13 RX ADMIN — Medication 40 MILLIGRAM(S): at 16:19

## 2020-04-13 RX ADMIN — Medication 100 MILLIGRAM(S): at 11:47

## 2020-04-13 RX ADMIN — Medication 200 MILLIGRAM(S): at 05:36

## 2020-04-13 RX ADMIN — HEPARIN SODIUM 1600 UNIT(S)/HR: 5000 INJECTION INTRAVENOUS; SUBCUTANEOUS at 13:58

## 2020-04-13 RX ADMIN — Medication 40 MILLIGRAM(S): at 05:35

## 2020-04-13 RX ADMIN — Medication 500 MILLIGRAM(S): at 05:36

## 2020-04-13 RX ADMIN — Medication 200 MILLIGRAM(S): at 16:20

## 2020-04-13 RX ADMIN — Medication 100 MILLIGRAM(S): at 22:44

## 2020-04-13 RX ADMIN — Medication 100 MILLIGRAM(S): at 05:35

## 2020-04-13 RX ADMIN — Medication 500 MILLIGRAM(S): at 11:47

## 2020-04-13 NOTE — PROGRESS NOTE ADULT - ASSESSMENT
61 y/o F with PMHX Exercise-induced Asthma c/o nonproductive cough, Fever/Chills, SOB/AC x2 weeks. She developed hypoxia last week and was rx'd Azithromycin, Albuterol Nebs, and steroids as outpatient. She developed LLE calf pain more recently and [place] [facility] [place] venous duplex doppler at [facility] [place] with +DVT LLE. Pt to have repeat duplex US left leg in 3 months. Pt admitted for acute hypoxic respiratory failure 2/2 multiple acute b/l PE. PT is s/p full dose heparin, transitioned to Eliquis today 4/13. Pt to c/w NOAC for 3-6 months. Pulm consult noted. TTE on 4/13 noted, LVEF 60-65%, no right heart strain. Pt currently hypoxic, will send script for home O2.     Acute Hypoxemic Respiratory Failure 2/2 multiple Acute Bilateral PE w/o right heart strain and prior COVID19 PNA  -Clinical improvement. Pt continues to require 2L O2 via NC. De'sat to low 80s on RA w/ and w/o ambulation.  -Likely 2/2 to immobility during recent COVID infection, pt reports that she was bedbound   -Leukocytosis noted, likely 2/2 b/l PE. Pt remains afebrile   -Started Eliquis, s/p heparin gtt   -Cardiac enzymes negative   -EKG noted.   -TTE on 4/13 - LVEF 60-65%, no right heart strain   -C/w Plaquenil - end date 4/16, QTc 433ms   -C/w Symbicort and Albuterol  -C/w vit c, thiamine  -C/w Mucinex, tessalon perle   -Pulm consult noted. Pt to repeat CT chest in 6 weeks     LLE DVT  -outpatient positive Venous duplex 4/11 at [facility] [place] only LLE performed  -C/w Eliquis  -Repeat duplex US in 3 months     Hypokalemia - resolved     Exercise-Induced Asthma  -Albuterol MDI PRN    Incidental L Adrenal Gland lesion on CTA  -outpatient f/u w/ PCP for f/u imaging     DVT ppx  -Pt on Eliquis for DVT/PE    Code Status: Full Code    Follow up:  PCP, Pulm    Patient and  aware of plan of care.     Dispo: Likely d/c in 1-2 days pending need for O2 supplement. Script will be sent to CM, CM aware. 59 y/o F with PMHX Exercise-induced Asthma c/o nonproductive cough, Fever/Chills, SOB/AC x2 weeks. She developed hypoxia last week and was rx'd Azithromycin, Albuterol Nebs, and steroids as outpatient. She developed LLE calf pain more recently and went [place] [facility] [place] had outpatient venous duplex doppler at [facility] with +DVT LLE. Pt to have repeat duplex US left leg in 3 months. Pt admitted for acute hypoxic respiratory failure 2/2 multiple acute b/l PE. PT is s/p full dose heparin, transitioned to Eliquis today 4/13. Pt to c/w NOAC for 3-6 months. Pulm consult noted. TTE on 4/13 noted, LVEF 60-65%, no right heart strain. Pt currently hypoxic, will send script for home O2.     Acute Hypoxemic Respiratory Failure 2/2 multiple Acute Bilateral PE w/o right heart strain and prior COVID19 PNA  -Clinical improvement. Pt continues to require 2L O2 via NC. De'sat to low 80s on RA w/ and w/o ambulation.  -Likely 2/2 to immobility during recent COVID infection, pt reports that she was bedbound   -Leukocytosis noted, likely 2/2 b/l PE. Pt remains afebrile   -Started Eliquis, s/p heparin gtt   -Cardiac enzymes negative   -EKG noted.   -TTE on 4/13 - LVEF 60-65%, no right heart strain   -C/w Plaquenil - end date 4/16, QTc 433ms   -C/w Symbicort and Albuterol  -C/w vit c, thiamine  -C/w Mucinex, tessalon perle   -Pulm consult noted. Pt to repeat CT chest in 6 weeks     LLE DVT  -outpatient positive Venous duplex 4/11 at [facility] only LLE performed  -C/w Eliquis  -Repeat duplex US in 3 months     Hypokalemia - resolved     Exercise-Induced Asthma  -Albuterol MDI PRN    Incidental L Adrenal Gland lesion on CTA  -outpatient f/u w/ PCP for f/u imaging     DVT ppx  -Pt on Eliquis for DVT/PE    Code Status: Full Code    Follow up:  PCP, Pulm    Patient and  (Lalo [phone]) aware of plan of care.     Dispo: Likely d/c in 1-2 days pending need for O2 supplement. Script will be sent to CM, CM aware.

## 2020-04-13 NOTE — DISCHARGE NOTE PROVIDER - NSDCMRMEDTOKEN_GEN_ALL_CORE_FT
albuterol 0.63 mg/3 mL (0.021%) inhalation solution: 3 milliliter(s) inhaled 3 times a day  ascorbic acid 500 mg oral tablet: 1 tab(s) orally 3 times a day  benzonatate 100 mg oral capsule: 1 cap(s) orally 3 times a day  Eliquis Starter Pack for Treatment of DVT and PE 5 mg oral tablet: 2 tab(s) orally 2 times a day for 7 days   1 tab orally 2 times a day for the remaining days   hydroxychloroquine 200 mg oral tablet: 2 tab(s) orally once a day   thiamine 100 mg oral tablet: 2 tab(s) orally once a day

## 2020-04-13 NOTE — PROGRESS NOTE ADULT - SUBJECTIVE AND OBJECTIVE BOX
PULMONARY PROGRESS NOTE      CAREN DISLAN-[id number]    Patient is a 60y old  Female who presents with a chief complaint of Acute Hypoxemic Respiratory Failure 2/2 suspected COVID19 PNA and multiple acute bilateral PEs and acute LLE DVT (2020 12:16)      INTERVAL HPI/OVERNIGHT EVENTS:  Currently feels better however remains hypoxic off O2 to 82%.  Has history of asthma and was recently on steroids and nebs>only getting PRN albuterol here and steroids  Some cough    MEDICATIONS  (STANDING):  apixaban 10 milliGRAM(s) Oral every 12 hours  ascorbic acid 500 milliGRAM(s) Oral three times a day  benzonatate 100 milliGRAM(s) Oral three times a day  budesonide 160 MICROgram(s)/formoterol 4.5 MICROgram(s) Inhaler 2 Puff(s) Inhalation two times a day  hydroxychloroquine   Oral   hydroxychloroquine 400 milliGRAM(s) Oral every 24 hours  methylPREDNISolone sodium succinate Injectable 40 milliGRAM(s) IV Push every 12 hours  thiamine 200 milliGRAM(s) Oral <User Schedule>      MEDICATIONS  (PRN):  acetaminophen    Suspension .. 650 milliGRAM(s) Oral every 6 hours PRN Temp greater or equal to 38C (100.4F), Mild Pain (1 - 3), Moderate Pain (4 - 6), Severe Pain (7 - 10)  ALBUTerol    90 MICROgram(s) HFA Inhaler 2 Puff(s) Inhalation every 6 hours PRN Shortness of Breath and/or Wheezing  guaiFENesin  milliGRAM(s) Oral every 12 hours PRN Cough      Allergies    penicillin (Unknown)    Intolerances        PAST MEDICAL & SURGICAL HISTORY:  Asthma  No significant past surgical history      SOCIAL HISTORY  Smoking History: Nonsmoker      REVIEW OF SYSTEMS:    CONSTITUTIONAL:  No distress    HEENT:  Eyes:  No diplopia or blurred vision. ENT:  No earache, sore throat or runny nose.    CARDIOVASCULAR:  No pressure, squeezing, tightness, heaviness or aching about the chest; no palpitations.    RESPIRATORY:  Mild cough, AC    GASTROINTESTINAL:  No nausea, vomiting or diarrhea.    GENITOURINARY:  No dysuria, frequency or urgency.    MUSCULOSKELETAL:  No joint pain    SKIN:  No new lesions.    NEUROLOGIC:  No paresthesias, fasciculations, seizures or weakness.    PSYCHIATRIC:  No disorder of thought or mood.    ENDOCRINE:  No heat or cold intolerance, polyuria or polydipsia.    HEMATOLOGICAL:  No easy bruising or bleeding.     Vital Signs Last 24 Hrs  T(C): 36.6 (2020 15:43), Max: 36.9 (2020 16:34)  T(F): 97.8 (2020 15:43), Max: 98.4 (2020 16:34)  HR: 75 (2020 15:43) (75 - 115)  BP: 100/65 (2020 15:43) (100/65 - 109/70)  BP(mean): --  RR: 20 (2020 15:43) (18 - 25)  SpO2: 96% (2020 15:43) (83% - 98%)    PHYSICAL EXAMINATION:    GENERAL: The patient is awake and alert in no apparent distress.     HEENT: Head is normocephalic and atraumatic. Extraocular muscles are intact. Mucous membranes are moist.    NECK: Supple.    LUNGS: Clear to auscultation without wheezing, rales or rhonchi; respirations unlabored>diminished at bases    HEART: Regular rate and rhythm without murmur.    ABDOMEN: Soft, nontender, and nondistended.      EXTREMITIES: Without any cyanosis, clubbing, rash, lesions or edema.    NEUROLOGIC: Grossly intact.    SKIN: No ulceration or induration present.      LABS:                        11.9   14.19 )-----------( 370      ( 2020 12:08 )             37.1     04-13    140  |  101  |  16.0  ----------------------------<  221<H>  3.8   |  23.0  |  0.74    Ca    9.3      2020 12:08  Phos  3.8     04-12  Mg     2.3     04-12    TPro  7.5  /  Alb  3.3  /  TBili  0.2<L>  /  DBili  x   /  AST  29  /  ALT  42<H>  /  AlkPhos  82  04-13    PT/INR - ( 2020 12:08 )   PT: 14.4 sec;   INR: 1.27 ratio         PTT - ( 2020 12:08 )  PTT:81.1 sec  Urinalysis Basic - ( 2020 17:02 )    Color: Yellow / Appearance: Clear / S.015 / pH: x  Gluc: x / Ketone: Negative  / Bili: Negative / Urobili: Negative mg/dL   Blood: x / Protein: 30 mg/dL / Nitrite: Negative   Leuk Esterase: Trace / RBC: 0-2 /HPF / WBC 3-5   Sq Epi: x / Non Sq Epi: Moderate / Bacteria: Few        CARDIAC MARKERS ( 2020 12:08 )  x     / <0.01 ng/mL / 51 U/L / x     / x      CARDIAC MARKERS ( 2020 05:03 )  x     / <0.01 ng/mL / x     / x     / x      CARDIAC MARKERS ( 2020 02:28 )  x     / <0.01 ng/mL / 52 U/L / x     / x      CARDIAC MARKERS ( 2020 16:10 )  x     / <0.01 ng/mL / x     / x     / x          D-Dimer Assay, Quantitative: 2650 ng/mL DDU (20 @ 12:08)    Serum Pro-Brain Natriuretic Peptide: 66 pg/mL (20 @ 07:03)  Serum Pro-Brain Natriuretic Peptide: 57 pg/mL (20 @ 16:10)      Procalcitonin, Serum: 0.07 ng/mL (20 @ 12:08)  Procalcitonin, Serum: 0.10 ng/mL (20 @ 16:10)      MICROBIOLOGY:    RADIOLOGY & ADDITIONAL STUDIES:< from: CT Angio Chest w/ IV Cont (20 @ 19:13) >   EXAM:  CT ANGIO CHEST (W)AW IC                          PROCEDURE DATE:  2020          INTERPRETATION:  CLINICAL INFORMATION: Two week history of cough, fever and shortness of breath.  Known COVID exposure.    COMPARISON: None.    PROCEDURE:  CT Angiography of the Chest.  44 mL of Omnipaque 350 was injected intravenously.  Fifty-six and were discarded.  Sagittal and coronal reformats were performed as well as 3D (MIP) reconstructions.      FINDINGS:    LUNGS AND AIRWAYS: Patent central airways.  There is patchy groundglass opacity and areas of more dense lung consolidation bilaterally, which has a predominant peripheral distribution..    PLEURA: No pleural effusion.    MEDIASTINUM AND YULISA: No lymphadenopathy.    VESSELS: There is lobar, segmental and subsegmental pulmonary emboli in the right upper, right middle and right lower lobes.  There are segmental and subsegmental pulmonary in the left upper lobe and lingula.    HEART: Heart size is normal.  No dilatation of the right ventricle or flattening of the interventricular septum. No pericardial effusion.    CHEST WALL AND LOWER NECK: Within normal limits.    VISUALIZED UPPER ABDOMEN: There is approximately 2.5 x 2.4 cm indeterminate lesion in the left adrenal gland.    BONES: Within normal limits.    IMPRESSION:     Acute pulmonary emboli bilaterally.  No evidence of right heart strain.    Patchy peripheral opacities in both lungs compatible, compatible with COVID-19 pneumonia.    Approximately 2.5 x 2.4 cm indeterminant left adrenal gland lesion.  If this has not been characterized, abdominal MRI should be obtained on a nonemergent basis.    CRITICAL VALUE: I discussed the major findings in this report with  Dr. Mcleod on 2020 at 7:40 PM.  Critical value policy of the hospital was followed. Read back and confirmation of receipt of this communication was performed. This verbal communication supplements the text report of this document.        < end of copied text >  < from: TTE Echo Complete w/o contrast w/ Doppler (20 @ 09:17) >  EXAM:  ECHO TTE WO CON COMP W DOPP      PROCEDURE DATE:  2020   .      INTERPRETATION:  REPORT:    TRANSTHORACIC ECHOCARDIOGRAM REPORT         Patient Name:   CAREN DISLA Patient Location: [facility]  Medical Rec #:  [id number]        Accession #:      [id number]  Account #:                      Height:           63.0 in 160.0 cm  YOB: 1960       Weight:           169.8 lb 77.00 kg  Patient Age:    60 years        BSA:              1.80 m²  Patient Gender: F               BP:               104/56 mmHg       Date of Exam:        2020 9:17:39 AM  Sonographer:         Shante Patel  Referring Physician: Antoinette Alexander MD    Procedure:     2D Echo/Doppler/Color Doppler Complete.  Indications:   Chest pain, unspecified -R07.9  Diagnosis:     normal right ventricle size and systolic function;  Study Details: Technically adequate study.         2D AND M-MODE MEASUREMENTS (normal ranges within parentheses):  Left                 Normal   Aorta/Left            Normal  Ventricle:                    Atrium:  IVSd (2D):    0.89  (0.7-1.1) Aortic Root  3.22 cm (2.4-3.7)                 cm             (2D):  LVPWd (2D):   0.89  (0.7-1.1) Left Atrium  3.72 cm (1.9-4.0)                 cm             (2D):  LVIDd (2D):   4.02  (3.4-5.7) LA Volume     25.0                 cm             Index         ml/m²  LVIDs (2D):   3.00                 cm  LV FS (2D):   25.4   (>25%)                  %  LV EF (2D):   51 %   (>55%)  Relative Wall 0.44   (<0.42)  Thickness    LV SYSTOLIC FUNCTION BY 2D PLANIMETRY (MOD):  EF-A4C View: 60.8 % EF-A2C View: 61.9 % EF-Biplane: 62 %    LV DIASTOLIC FUNCTION:  MV Peak E: 0.84 m/s  MV Peak A: 1.01 m/s  E/A Ratio: 0.84    SPECTRAL DOPPLER ANALYSIS (where applicable):  Aortic Valve: AoV Max Mane: 1.27 m/s AoV Peak P.5 mmHg AoV Mean PG: 3.0 mmHg    LVOT Vmax: 0.92 m/s LVOT VTI: 0.220 m LVOT Diameter: 1.81 cm    AoV Area, Vmax: 1.87 cm² AoV Area, VTI: 1.80 cm² AoV Area, Vmn: 1.93 cm²  Ao VTI: 0.315  Tricuspid Valve and PA/RV SystolicPressure: TR Max Velocity:  RA Pressure: 3 mmHg RVSP/PASP:       PHYSICIAN INTERPRETATION:  Left Ventricle: The left ventricular internal cavity size is normal. Left ventricular wall thickness is normal.  Global LV systolic function was normal. Left ventricular ejection fraction, by visual estimation, is 60 to 65%. No obvious left ventricle regional wall motion abnormalities visualized.  Right Ventricle: Normal right ventricular size and systolic function.  Left Atrium: The left atrium is normal in size.  Right Atrium: The right atrium is normal in size.  Pericardium: There is no evidence of pericardial effusion.  Mitral Valve: Mild thickening and calcification of the anterior and posterior mitral valve leaflets.  Tricuspid Valve: Trivial tricuspid regurgitation is visualized. Adequate TR velocity was not obtained to accurately assess RVSP.  Aortic Valve: The aortic valve is trileaflet. No evidence of aortic valve regurgitation is seen. Mild aortic valve leaflet calcification. No aortic stenosis.  Pulmonic Valve: The pulmonic valve was not well visualized. Trace pulmonic valve regurgitation.  Aorta: The aortic root is normal in size and structure. Top normal ascending aorta (3.5 cm; 1.9 cm/m^2).  Pulmonary Artery: The main pulmonary artery is normal in size.  Venous: The inferior vena cava was normal sized, with respiratory size variation greater than 50%.       Summary:   1. Normal global left ventricular systolic function.   2. Left ventricular ejection fraction, by visual estimation, is 60 to 65%.   3. No obvious left ventricle regional wall motion abnormalities visualized.   4. Normal right ventricle size and systolic function.   5. The left atrium is normal in size.   6. The right atrium is normal in size.   7. Mild thickening and calcification of the anterior and posterior mitral valve leaflets.   8. Mild aortic valve leaflet calcification. No aortic stenosis.   9. Top normal ascending aorta (3.5 cm; 1.9 cm/m^2).  10. Recommend clinical correlation with the above findings.    Oanh Alexis MD Electronically signed on 2020 at 10:45:20 AM       < end of copied text >

## 2020-04-13 NOTE — DISCHARGE NOTE PROVIDER - NSDCCPCAREPLAN_GEN_ALL_CORE_FT
PRINCIPAL DISCHARGE DIAGNOSIS  Diagnosis: Acute pulmonary embolism without acute cor pulmonale  Assessment and Plan of Treatment: You have difficuly breathing and required supplemental oxygen because you were found to have clots (pulmonary emboli) in your lungs. You no longer require supplemental oxygen and you are breathing on room air.   -Imaging (TTE) of the heart showed that you had normal pump function and no right heart strain.   -You were started on blood thinner via IV, continue taking blood thinner by mouth for at least 3-6 months. See direction below:  -Eliquis 5mg 2 tabs twice daily for 7 days and then continue with 5mg 1 tab twice daily.  -Follow with your Primary Care Doctor in 3-5 days.  -Follow with Pulmonology in 2 weeks.      SECONDARY DISCHARGE DIAGNOSES  Diagnosis: Pneumonia due to COVID-19 virus  Assessment and Plan of Treatment: You were previously tested positive for the COVID virus, your symptoms have improved. Repeat testing during admission was negative.   Imaging of your chest showed the presence of infiltrates.  -Continue taking Plaquenil, vitamin c, thiamine and multivitamin.  -You no longer require supplemental oxygen. Continue to use albuterol inhaler as needed.  -Follow up with your Primary Care Doctor in 3-5 days.  -Have a repeat CT of the chest in 6 weeks.    Diagnosis: DVT (deep venous thrombosis)  Assessment and Plan of Treatment: You were found to have a clot in your left leg. You were started on blood thinner via IV, continue taking blood thinner by mouth.  -Follow up with your Primary Care Doctor in 3 months to have repeat duplex US of your left leg.    Diagnosis: Adrenal incidentaloma  Assessment and Plan of Treatment: You were noted on imaging to have a mass in your left adrenal gland.  -Please follow up with your Primary Care Doctor for repeat imaging for further evaluation.    Diagnosis: Exercise-induced asthma  Assessment and Plan of Treatment: Continue with your inhaler as needed.

## 2020-04-13 NOTE — DISCHARGE NOTE PROVIDER - PROVIDER TOKENS
FREE:[LAST:[Termini],FIRST:[Mannie],PHONE:[phone]],FAX:[(   [phone]    -],ADDRESS:[[address] [place] [address]]],PROVIDER:[TOKEN:[[id number]]]

## 2020-04-13 NOTE — DISCHARGE NOTE PROVIDER - HOSPITAL COURSE
59 y/o F with PMHX Exercise-induced Asthma c/o nonproductive cough, Fever/Chills, SOB/AC x2 weeks. She developed hypoxia last week and was rx'd Azithromycin, Albuterol Nebs, and steroids as outpatient. She developed LLE calf pain more recently and went to [facility] [place] - had outpatient venous duplex doppler at [facility] with +DVT LLE. Pt to have repeat duplex US left leg in 3 months. Pt admitted for acute hypoxic respiratory failure 2/2 multiple acute b/l PE. PT is s/p full dose heparin, transitioned to Eliquis on d/c. Pt to c/w NOAC for at least 3 months. Pt consult noted. Pt to have repeat CT chest in 6 weeks. TTE on 4/13 noted, LVEF 60-65%, normal R atrium and ventricle. Pt was recently tested positive for COVID19 outpt, repeat testing during admission negative, noted to have elevated inflammatory markers. Pt was initially on 2-3L O2 via NC, now sat'ing appropriately on RA. Pt was started on Plaquenil, end date 4/16. Pt is s/p IV steroids. Pt to c/w vitamin c, b1 and mvn after d/c. Pt noted to have incidental finding of L adrenal lesion, pt to f/u w/ PCP for repeat imaging for further eval. Pt to c/w albuterol prn for exercised-induced asthma. Pt to f/u PCP and Pulm after d/c.            All electrolyte abnormalities were monitored carefully and repleted as necessary during this hospitalization. At the time of discharge patient was hemodynamically stable and amenable to all terms of discharge. The patient has received verbal instructions from myself regarding discharge plans.         Length of Discharge: 45MIN        Vital Signs Last 24 Hrs    T(C): 36.8 (13 Apr 2020 07:41), Max: 36.9 (12 Apr 2020 16:34)    T(F): 98.2 (13 Apr 2020 07:41), Max: 98.4 (12 Apr 2020 16:34)    HR: 86 (13 Apr 2020 07:41) (76 - 86)    BP: 109/70 (13 Apr 2020 07:41) (94/54 - 109/70)    RR: 18 (13 Apr 2020 07:41) (18 - 22)    SpO2: 98% (13 Apr 2020 07:41) (95% - 98%)        PHYSICAL EXAM:    General: NAD, well nourished and developed     HEENT: NC/AT, EOMI, MMM    Neck: Supple, no JVD    Respiratory: Normal respiratory rate and effort, lungs are clear to auscultation bilaterally.    Cardiac: Regular rate and rhythm, no murmurs, rubs or gallop.    GI: Abdomen is soft, nontender, nondistended, no rebound or guarding, bowel sounds are normal.    Extremities: No cyanosis, no edema, pedal pulses +2 bilaterally. Andrea Sign negative.    Neurological: Patient is alert and oriented x4, CN II-XII grossly intact, no sensory or motor deficits.    Psych: Normal speech and affect, AAO X3 59 y/o F with PMHX Exercise-induced Asthma c/o nonproductive cough, Fever/Chills, SOB/AC x2 weeks. She developed hypoxia last week and was rx'd Azithromycin, Albuterol Nebs, and steroids as outpatient. She developed LLE calf pain more recently and went to [facility] [place] - had outpatient venous duplex doppler at [facility] with +DVT LLE. Pt to have repeat duplex US left leg in 3 months. Pt admitted for acute hypoxic respiratory failure 2/2 multiple acute b/l PE. PT is s/p full dose heparin, transitioned to Eliquis on d/c. Pt to c/w NOAC for at least 3 months. Pulm consult noted. Pt to have repeat CT chest in 6 weeks. TTE on 4/13 noted, LVEF 60-65%, normal R atrium and ventricle. Pt was recently tested positive for COVID19 outpt, repeat testing during admission negative, noted to have elevated inflammatory markers. Pt was initially on 2-3L O2 via NC, now sat'ing appropriately on RA. Pt was started on Plaquenil, end date 4/16. Pt is s/p IV steroids. Pt to c/w vitamin c, b1 and mvn after d/c. Pt noted to have incidental finding of L adrenal lesion, pt to f/u w/ PCP for repeat imaging for further eval. Pt to c/w albuterol prn for exercised-induced asthma. Pt to f/u PCP and Pulm after d/c.            All electrolyte abnormalities were monitored carefully and repleted as necessary during this hospitalization. At the time of discharge patient was hemodynamically stable and amenable to all terms of discharge. The patient has received verbal instructions from myself regarding discharge plans.         Length of Discharge: 45MIN        Vital Signs Last 24 Hrs    T(C): 36.8 (13 Apr 2020 07:41), Max: 36.9 (12 Apr 2020 16:34)    T(F): 98.2 (13 Apr 2020 07:41), Max: 98.4 (12 Apr 2020 16:34)    HR: 86 (13 Apr 2020 07:41) (76 - 86)    BP: 109/70 (13 Apr 2020 07:41) (94/54 - 109/70)    RR: 18 (13 Apr 2020 07:41) (18 - 22)    SpO2: 98% (13 Apr 2020 07:41) (95% - 98%)        PHYSICAL EXAM:    General: NAD, well nourished and developed     HEENT: NC/AT, EOMI, MMM    Neck: Supple, no JVD    Respiratory: Normal respiratory rate and effort, lungs are clear to auscultation bilaterally.    Cardiac: Regular rate and rhythm, no murmurs, rubs or gallop.    GI: Abdomen is soft, nontender, nondistended, no rebound or guarding, bowel sounds are normal.    Extremities: No cyanosis, no edema, pedal pulses +2 bilaterally. Andrea Sign negative.    Neurological: Patient is alert and oriented x4, CN II-XII grossly intact, no sensory or motor deficits.    Psych: Normal speech and affect, AAO X3 61 y/o F with PMHX Exercise-induced Asthma c/o nonproductive cough, Fever/Chills, SOB/AC x2 weeks. She developed hypoxia last week and was rx'd Azithromycin, Albuterol Nebs, and steroids as outpatient. She developed LLE calf pain more recently and went to [facility] [place] - had outpatient venous duplex doppler at [facility] with +DVT LLE. Pt to have repeat duplex US left leg in 3 months. Pt admitted for acute hypoxic respiratory failure 2/2 multiple acute b/l PE. PT is s/p full dose heparin, transitioned to Eliquis on d/c. Pt to c/w NOAC for at least 3 months. Pulm consult noted. Pt to have repeat CT chest in 6 weeks. TTE on 4/13 noted, LVEF 60-65%, normal R atrium and ventricle. Pt was recently tested positive for COVID19 outpt, repeat testing during admission negative, noted to have elevated inflammatory markers. Pt was on 2-3L O2 via NC, Patient will require home oxygen concentrator and portable oxygen for dx of Pulmonary Embolus. Patient oxygen saturation at rest on Room Air is= 88%, noted to desat on ambulation as well to 87%. Patient to receive portable oxygen to be used via NC, at 3L/min continuously at home. Pt was started on Plaquenil, end date 4/16. Pt is s/p IV steroids. Pt to c/w vitamin c, b1 and mvn after d/c. Pt noted to have incidental finding of L adrenal lesion, pt to f/u w/ PCP for repeat imaging for further eval. Pt to c/w albuterol prn for exercised-induced asthma. Pt to f/u PCP and Pulm after d/c.        All electrolyte abnormalities were monitored carefully and repleted as necessary during this hospitalization. At the time of discharge patient was hemodynamically stable and amenable to all terms of discharge. The patient has received verbal instructions from myself regarding discharge plans.         Length of Discharge: 45MIN        Vital Signs Last 24 Hrs    T(F): 97.7 (14 Apr 2020 08:05), Max: 97.8 (13 Apr 2020 15:43)    HR: 70 (14 Apr 2020 08:05) (70 - 115)    BP: 107/72 (14 Apr 2020 08:05) (100/65 - 107/72)    RR: 20 (14 Apr 2020 08:05) (20 - 25)    SpO2: 97% (14 Apr 2020 08:05) (83% - 97%)        PHYSICAL EXAM:    General: NAD, well nourished and developed     HEENT: NC/AT, EOMI, MMM    Neck: Supple, no JVD    Respiratory: Normal respiratory rate and effort, lungs are clear to auscultation bilaterally.    Cardiac: Regular rate and rhythm, no murmurs, rubs or gallop.    GI: Abdomen is soft, nontender, nondistended, no rebound or guarding, bowel sounds are normal.    Extremities: No cyanosis, no edema, pedal pulses +2 bilaterally. Andrea Sign negative.    Neurological: Patient is alert and oriented x4, CN II-XII grossly intact, no sensory or motor deficits.    Psych: Normal speech and affect, AAO X3

## 2020-04-13 NOTE — DISCHARGE NOTE PROVIDER - NSDCFUADDINST_GEN_ALL_CORE_FT
-Activity as tolerated.    CBC                    11.9   14.19 )-----------( 370      ( 13 Apr 2020 12:08 )             37.1     CMP  04-11    139  |  98  |  10.0  ----------------------------<  98  3.3<L>   |  28.0  |  0.74    Ca    9.0      11 Apr 2020 16:10  Phos  3.8     04-12  Mg     2.3     04-12    TPro  7.3  /  Alb  3.3  /  TBili  0.5  /  DBili  x   /  AST  24  /  ALT  30  /  AlkPhos  84  04-11    CTA chest   IMPRESSION:   Acute pulmonary emboli bilaterally.  No evidence of right heart strain.  Patchy peripheral opacities in both lungs compatible, compatible with COVID-19 pneumonia.  Approximately 2.5 x 2.4 cm indeterminant left adrenal gland lesion.  If this has not been characterized, abdominal MRI should be obtained on a nonemergent basis.    TTE  Summary:   1. Normal global left ventricular systolic function.   2. Left ventricular ejection fraction, by visual estimation, is 60 to 65%.   3. No obvious left ventricle regional wall motion abnormalities visualized.   4. Normal right ventricle size and systolic function.   5. The left atrium is normal in size.   6. The right atrium is normal in size.   7. Mild thickening and calcification of the anterior and posterior mitral valve leaflets.   8. Mild aortic valve leaflet calcification. No aortic stenosis.   9. Top normal ascending aorta (3.5 cm; 1.9 cm/m^2).  10. Recommend clinical correlation with the above findings.

## 2020-04-13 NOTE — DISCHARGE NOTE PROVIDER - CARE PROVIDER_API CALL
Mannie Mckeon  [address] [place] [address]  Phone: [phone]  Fax: (   )    -  Follow Up Time:     Shaista Holt)  Internal Medicine; Pulmonary Disease; Sleep Medicine  [address] [place] [address], [place] [address]  Phone: [phone]  Fax: [phone]  Follow Up Time:

## 2020-04-13 NOTE — PROGRESS NOTE ADULT - SUBJECTIVE AND OBJECTIVE BOX
Patient is a 60y old  Female who presents with a chief complaint of Acute Hypoxemic Respiratory Failure 2/2 suspected COVID19 PNA and multiple acute bilateral PEs and acute LLE DVT (12 Apr 2020 11:27)    INTERVAL HPI/OVERNIGHT EVENTS:  Pt seen and examined at bedside. Pt reports feeling better with some mild SOB. Pt continues to be on 2L O2 via NC. Pt was to be discharged today, however, patient noted to de'sat on RA to low 80s while at rest. On ambulation pt was sat'ing at 83% without O2. Pt is tolerating PO and last BM was normal.  ROS: Denies fever, chills, n/v/d/c, palpitations,  abdominal pain, blood in urine or stool, calf tenderness/swelling. Remainder of ROS negative.       Vital Signs Last 24 Hrs  T(C): 36.9 (12 Apr 2020 16:34), Max: 37.7 (11 Apr 2020 18:22)  T(F): 98.4 (12 Apr 2020 16:34), Max: 99.9 (11 Apr 2020 18:22)  HR: 82 (12 Apr 2020 16:34) (82 - 103)  BP: 101/66 (12 Apr 2020 16:34) (94/52 - 129/64)  RR: 22 (12 Apr 2020 16:34) (17 - 24)  SpO2: 95% (12 Apr 2020 16:34) (88% - 97%)    Telemetry: No events overnight. Now discontinued.  O2 supplemental: 2L NC    PHYSICAL EXAM:  General: NAD, well nourished and developed   HEENT: NC/AT, EOMI, MMM  Neck: Supple, no JVD  Respiratory: Normal respiratory rate and effort, lungs are clear to auscultation bilaterally.  Cardiac: Regular rate and rhythm, no murmurs, rubs or gallop.  GI: Abdomen is soft, nontender, nondistended, no rebound or guarding, bowel sounds are normal.  Extremities: No cyanosis, no edema, pedal pulses +2 bilaterally. Andrea Sign negative.  Neurological: Patient is alert and oriented x4, CN II-XII grossly intact, no sensory or motor deficits.  Psych: Normal speech and affect, AAO X3    LABS:                        11.9   14.19 )-----------( 370      ( 13 Apr 2020 12:08 )             37.1       04-13    140  |  101  |  16.0  ----------------------------<  221<H>  3.8   |  23.0  |  0.74    Ca    9.3      13 Apr 2020 12:08  Phos  3.8     04-12  Mg     2.3     04-12    TPro  7.5  /  Alb  3.3  /  TBili  0.2<L>  /  DBili  x   /  AST  29  /  ALT  42<H>  /  AlkPhos  82  04-13  PT/INR - ( 11 Apr 2020 16:10 )   PT: 15.7 sec;   INR: 1.38 ratio    PTT - ( 12 Apr 2020 14:04 )  PTT:73.5 sec    Urine Microscopic-Add On (NC) (04.12.20 @ 17:02)    Bacteria: Few    Epithelial Cells: Moderate    Red Blood Cell - Urine: 0-2 /HPF    White Blood Cell - Urine: 3-5    Hemoglobin A1C, Whole Blood: 6.2 % (04-12 @ 05:03)    Serum Pro-Brain Natriuretic Peptide: 66 pg/mL (04-12 @ 07:03)  Procalcitonin, Serum: 0.10 ng/mL (04-11 @ 16:10)  Serum Pro-Brain Natriuretic Peptide: 57 pg/mL (04-11 @ 16:10)    12 Lead ECG (04.12.20 @ 13:19)  Ventricular Rate 83 BPM  Atrial Rate 83 BPM  P-R Interval 164 ms  QRS Duration 80 ms  Q-T Interval 404 ms  QTC Calculation(Bezet) 474 ms  P Axis 53 degrees  R Axis 38 degrees  T Axis 34 degrees    Diagnosis Line Normal sinus rhythm  Cannot rule out Anterior infarct , age undetermined  Abnormal ECG    US Duplex Venous Lower Ext Complete, Bilateral (04.12.20 @ 07:46)   FINDINGS:  There is normal compressibility of the right common femoral, femoral and popliteal veins.   Doppler examination shows normal spontaneous and phasic flow.  No right calf vein thrombosis is detected.  Thrombus noted throughout the deep veins of the left lower extremity.    IMPRESSION:   Extensive deep venous thrombosis throughout the left lower extremity.     CT Angio Chest w/ IV Cont (04.11.20 @ 19:13)   FINDINGS:  LUNGS AND AIRWAYS: Patent central airways.  There is patchy groundglass opacity and areas of more dense lung consolidation bilaterally, which has a predominant peripheral distribution..  PLEURA: No pleural effusion.  MEDIASTINUM AND YULISA: No lymphadenopathy.  VESSELS: There is lobar, segmental and subsegmental pulmonary emboli in the right upper, right middle and right lower lobes.  There are segmental and subsegmental pulmonary in the left upper lobe and lingula.  HEART: Heart size is normal.  No dilatation of the right ventricle or flattening of the interventricular septum. No pericardial effusion.  CHEST WALL AND LOWER NECK: Within normal limits.  VISUALIZED UPPER ABDOMEN: There is approximately 2.5 x 2.4 cm indeterminate lesion in the left adrenal gland.  BONES: Within normal limits.    IMPRESSION:   Acute pulmonary emboli bilaterally.  No evidence of right heart strain.  Patchy peripheral opacities in both lungs compatible, compatible with COVID-19 pneumonia.  Approximately 2.5 x 2.4 cm indeterminant left adrenal gland lesion.  If this has not been characterized, abdominal MRI should be obtained on a nonemergent basis.    MEDICATIONS  (STANDING):  apixaban 10 milliGRAM(s) Oral every 12 hours  ascorbic acid 500 milliGRAM(s) Oral three times a day  benzonatate 100 milliGRAM(s) Oral three times a day  hydroxychloroquine   Oral   hydroxychloroquine 400 milliGRAM(s) Oral every 24 hours  methylPREDNISolone sodium succinate Injectable 40 milliGRAM(s) IV Push every 12 hours  thiamine 200 milliGRAM(s) Oral <User Schedule>    MEDICATIONS  (PRN):  acetaminophen    Suspension .. 650 milliGRAM(s) Oral every 6 hours PRN Temp greater or equal to 38C (100.4F), Mild Pain (1 - 3), Moderate Pain (4 - 6), Severe Pain (7 - 10)  ALBUTerol    90 MICROgram(s) HFA Inhaler 2 Puff(s) Inhalation every 6 hours PRN Shortness of Breath and/or Wheezing  guaiFENesin  milliGRAM(s) Oral every 12 hours PRN Cough I will SWITCH the dose or number of times a day I take the medications listed below when I get home from the hospital:  None

## 2020-04-13 NOTE — PROGRESS NOTE ADULT - ASSESSMENT
DVT/PE  Post COVID infection with likely some residual.  However review of CT suggests that there are areas more consistent with infarct  Hypoxemia requiring O2 but doing OK  H/O asthma on no routine bronchodilator therapy which may be contributing to hypoxemia  ?pulmonary infarcts      Plan:  1.Anticoagulation   2. Will need hematology followup   3.Add LABA/ICS  4.Will need f/u CT in about 6-8 weeks re:pleural based infiltrates which likely represent infarcts  5.O2 to maintain sat>90  6.Finish steroids

## 2020-04-14 ENCOUNTER — TRANSCRIPTION ENCOUNTER (OUTPATIENT)
Age: 60
End: 2020-04-14

## 2020-04-14 VITALS
HEART RATE: 76 BPM | RESPIRATION RATE: 20 BRPM | OXYGEN SATURATION: 92 % | DIASTOLIC BLOOD PRESSURE: 68 MMHG | TEMPERATURE: 98 F | SYSTOLIC BLOOD PRESSURE: 115 MMHG

## 2020-04-14 LAB
ANION GAP SERPL CALC-SCNC: 14 MMOL/L — SIGNIFICANT CHANGE UP (ref 5–17)
APTT BLD: 27.3 SEC — LOW (ref 27.5–36.3)
BUN SERPL-MCNC: 19 MG/DL — SIGNIFICANT CHANGE UP (ref 8–20)
CALCIUM SERPL-MCNC: 9.1 MG/DL — SIGNIFICANT CHANGE UP (ref 8.6–10.2)
CHLORIDE SERPL-SCNC: 102 MMOL/L — SIGNIFICANT CHANGE UP (ref 98–107)
CO2 SERPL-SCNC: 26 MMOL/L — SIGNIFICANT CHANGE UP (ref 22–29)
CREAT SERPL-MCNC: 0.72 MG/DL — SIGNIFICANT CHANGE UP (ref 0.5–1.3)
GLUCOSE SERPL-MCNC: 131 MG/DL — HIGH (ref 70–99)
HCT VFR BLD CALC: 36.1 % — SIGNIFICANT CHANGE UP (ref 34.5–45)
HGB BLD-MCNC: 11.3 G/DL — LOW (ref 11.5–15.5)
MAGNESIUM SERPL-MCNC: 2.6 MG/DL — SIGNIFICANT CHANGE UP (ref 1.8–2.6)
MCHC RBC-ENTMCNC: 27.5 PG — SIGNIFICANT CHANGE UP (ref 27–34)
MCHC RBC-ENTMCNC: 31.3 GM/DL — LOW (ref 32–36)
MCV RBC AUTO: 87.8 FL — SIGNIFICANT CHANGE UP (ref 80–100)
PHOSPHATE SERPL-MCNC: 4.1 MG/DL — SIGNIFICANT CHANGE UP (ref 2.4–4.7)
PLATELET # BLD AUTO: 386 K/UL — SIGNIFICANT CHANGE UP (ref 150–400)
POTASSIUM SERPL-MCNC: 4.4 MMOL/L — SIGNIFICANT CHANGE UP (ref 3.5–5.3)
POTASSIUM SERPL-SCNC: 4.4 MMOL/L — SIGNIFICANT CHANGE UP (ref 3.5–5.3)
RBC # BLD: 4.11 M/UL — SIGNIFICANT CHANGE UP (ref 3.8–5.2)
RBC # FLD: 13.2 % — SIGNIFICANT CHANGE UP (ref 10.3–14.5)
SODIUM SERPL-SCNC: 142 MMOL/L — SIGNIFICANT CHANGE UP (ref 135–145)
WBC # BLD: 15.88 K/UL — HIGH (ref 3.8–10.5)
WBC # FLD AUTO: 15.88 K/UL — HIGH (ref 3.8–10.5)

## 2020-04-14 PROCEDURE — 71275 CT ANGIOGRAPHY CHEST: CPT

## 2020-04-14 PROCEDURE — 85652 RBC SED RATE AUTOMATED: CPT

## 2020-04-14 PROCEDURE — 71045 X-RAY EXAM CHEST 1 VIEW: CPT

## 2020-04-14 PROCEDURE — 85027 COMPLETE CBC AUTOMATED: CPT

## 2020-04-14 PROCEDURE — 83735 ASSAY OF MAGNESIUM: CPT

## 2020-04-14 PROCEDURE — 80053 COMPREHEN METABOLIC PANEL: CPT

## 2020-04-14 PROCEDURE — 99285 EMERGENCY DEPT VISIT HI MDM: CPT

## 2020-04-14 PROCEDURE — 85379 FIBRIN DEGRADATION QUANT: CPT

## 2020-04-14 PROCEDURE — 86140 C-REACTIVE PROTEIN: CPT

## 2020-04-14 PROCEDURE — 80048 BASIC METABOLIC PNL TOTAL CA: CPT

## 2020-04-14 PROCEDURE — 84100 ASSAY OF PHOSPHORUS: CPT

## 2020-04-14 PROCEDURE — 87635 SARS-COV-2 COVID-19 AMP PRB: CPT

## 2020-04-14 PROCEDURE — 83880 ASSAY OF NATRIURETIC PEPTIDE: CPT

## 2020-04-14 PROCEDURE — 81001 URINALYSIS AUTO W/SCOPE: CPT

## 2020-04-14 PROCEDURE — 82550 ASSAY OF CK (CPK): CPT

## 2020-04-14 PROCEDURE — 94640 AIRWAY INHALATION TREATMENT: CPT

## 2020-04-14 PROCEDURE — 83605 ASSAY OF LACTIC ACID: CPT

## 2020-04-14 PROCEDURE — 85384 FIBRINOGEN ACTIVITY: CPT

## 2020-04-14 PROCEDURE — 85610 PROTHROMBIN TIME: CPT

## 2020-04-14 PROCEDURE — 93010 ELECTROCARDIOGRAM REPORT: CPT

## 2020-04-14 PROCEDURE — 86803 HEPATITIS C AB TEST: CPT

## 2020-04-14 PROCEDURE — 93970 EXTREMITY STUDY: CPT

## 2020-04-14 PROCEDURE — 84484 ASSAY OF TROPONIN QUANT: CPT

## 2020-04-14 PROCEDURE — 93005 ELECTROCARDIOGRAM TRACING: CPT

## 2020-04-14 PROCEDURE — 36415 COLL VENOUS BLD VENIPUNCTURE: CPT

## 2020-04-14 PROCEDURE — 84443 ASSAY THYROID STIM HORMONE: CPT

## 2020-04-14 PROCEDURE — 93306 TTE W/DOPPLER COMPLETE: CPT

## 2020-04-14 PROCEDURE — 84145 PROCALCITONIN (PCT): CPT

## 2020-04-14 PROCEDURE — 99239 HOSP IP/OBS DSCHRG MGMT >30: CPT | Mod: GC

## 2020-04-14 PROCEDURE — 85730 THROMBOPLASTIN TIME PARTIAL: CPT

## 2020-04-14 PROCEDURE — 80061 LIPID PANEL: CPT

## 2020-04-14 PROCEDURE — 83615 LACTATE (LD) (LDH) ENZYME: CPT

## 2020-04-14 PROCEDURE — 82728 ASSAY OF FERRITIN: CPT

## 2020-04-14 PROCEDURE — 83036 HEMOGLOBIN GLYCOSYLATED A1C: CPT

## 2020-04-14 RX ORDER — APIXABAN 2.5 MG/1
2 TABLET, FILM COATED ORAL
Qty: 24 | Refills: 0
Start: 2020-04-14 | End: 2020-04-19

## 2020-04-14 RX ADMIN — Medication 40 MILLIGRAM(S): at 16:36

## 2020-04-14 RX ADMIN — APIXABAN 10 MILLIGRAM(S): 2.5 TABLET, FILM COATED ORAL at 16:35

## 2020-04-14 RX ADMIN — Medication 100 MILLIGRAM(S): at 11:23

## 2020-04-14 RX ADMIN — Medication 400 MILLIGRAM(S): at 02:30

## 2020-04-14 RX ADMIN — Medication 100 MILLIGRAM(S): at 05:40

## 2020-04-14 RX ADMIN — APIXABAN 10 MILLIGRAM(S): 2.5 TABLET, FILM COATED ORAL at 05:40

## 2020-04-14 RX ADMIN — Medication 200 MILLIGRAM(S): at 05:40

## 2020-04-14 RX ADMIN — BUDESONIDE AND FORMOTEROL FUMARATE DIHYDRATE 2 PUFF(S): 160; 4.5 AEROSOL RESPIRATORY (INHALATION) at 10:19

## 2020-04-14 RX ADMIN — Medication 200 MILLIGRAM(S): at 16:52

## 2020-04-14 RX ADMIN — Medication 500 MILLIGRAM(S): at 05:40

## 2020-04-14 RX ADMIN — Medication 40 MILLIGRAM(S): at 05:40

## 2020-04-14 RX ADMIN — Medication 500 MILLIGRAM(S): at 11:24

## 2020-04-14 NOTE — DISCHARGE NOTE NURSING/CASE MANAGEMENT/SOCIAL WORK - PATIENT PORTAL LINK FT
You can access the FollowMyHealth Patient Portal offered by [facility] by registering at the following website: [email or web address]. By joining [employer]’s FollowMyHealth portal, you will also be able to view your health information using other applications (apps) compatible with our system.

## 2020-04-14 NOTE — CHART NOTE - NSCHARTNOTEFT_GEN_A_CORE
Patient will require home oxygen concentrator and portable oxygen for dx of Pulmonary Embolus.  - Patient oxygen saturation at rest on Room Air is=  - Oxygen saturation with oxygen on ambulation is=  - Oxygen saturation with ambulation with oxygen supplementation 2 liters is= Patient will require home oxygen concentrator and portable oxygen for dx of Pulmonary Embolus.  - Patient oxygen saturation at rest on Room Air is= 88%  - O2 via Nasal canula at 3 liter/min continuously

## 2020-04-24 PROBLEM — J45.909 UNSPECIFIED ASTHMA, UNCOMPLICATED: Chronic | Status: ACTIVE | Noted: 2020-04-11

## 2020-05-04 ENCOUNTER — APPOINTMENT (OUTPATIENT)
Dept: PULMONOLOGY | Facility: CLINIC | Age: 60
End: 2020-05-04
Payer: COMMERCIAL

## 2020-05-04 VITALS — BODY MASS INDEX: 29.41 KG/M2 | WEIGHT: 166 LBS | HEIGHT: 63 IN

## 2020-05-04 DIAGNOSIS — Z87.891 PERSONAL HISTORY OF NICOTINE DEPENDENCE: ICD-10-CM

## 2020-05-04 DIAGNOSIS — U07.1 COVID-19: ICD-10-CM

## 2020-05-04 DIAGNOSIS — I82.492 ACUTE EMBOLISM AND THROMBOSIS OF OTHER SPECIFIED DEEP VEIN OF LEFT LOWER EXTREMITY: ICD-10-CM

## 2020-05-04 DIAGNOSIS — I26.99 OTHER PULMONARY EMBOLISM W/OUT ACUTE COR PULMONALE: ICD-10-CM

## 2020-05-04 PROCEDURE — 99213 OFFICE O/P EST LOW 20 MIN: CPT | Mod: 95

## 2020-05-04 RX ORDER — RIBOFLAVIN (VITAMIN B2) 100 MG
100 TABLET ORAL
Refills: 0 | Status: ACTIVE | COMMUNITY

## 2020-05-04 RX ORDER — THIAMINE MONONITRATE (VIT B1) 100 MG
100 TABLET ORAL
Refills: 0 | Status: ACTIVE | COMMUNITY

## 2020-05-04 RX ORDER — APIXABAN 5 MG/1
5 TABLET, FILM COATED ORAL
Refills: 0 | Status: ACTIVE | COMMUNITY

## 2020-05-04 RX ORDER — GUAIFENESIN 600 MG/1
600 TABLET, EXTENDED RELEASE ORAL
Refills: 0 | Status: ACTIVE | COMMUNITY

## 2020-05-04 NOTE — CONSULT LETTER
[Dear  ___] : Dear  [unfilled], [Courtesy Letter:] : I had the pleasure of seeing your patient, [unfilled], in my office today. [Consult Closing:] : Thank you very much for allowing me to participate in the care of this patient.  If you have any questions, please do not hesitate to contact me. [Please see my note below.] : Please see my note below. [FreeTextEntry3] : Shaista Holt MD FCCP\par D-ABSM\par ABIM board certified in  Pulmonary diseases, Sleep medicine\par Internal medicine\par  [Sincerely,] : Sincerely,

## 2020-05-04 NOTE — HISTORY OF PRESENT ILLNESS
[Medical Office: ([facility])___] : at the medical office located in  [Home] : at home, [unfilled] , at the time of the visit. [Patient] : the patient [Self] : self [FreeTextEntry2] : Angela Toerasmos [TextBox_4] : Patient was hospitalized 3 weeks ago with left DVT and pulmonary emboli. She had been sick at home with corona virus 19 infection for several weeks feeling weak. She was largely in bed.\par \par CT angiogram of the chest showed bilateral pulmonary emboli an echocardiogram showed no evidence for right heart strain. She was discharged on Eliquis for 6 months.\par \par She is feeling stronger with no shortness of breath. She had this last week of transient left leg swelling which disappeared with putting her leg up. Denies cough wheeze or chest pain. Saturations have been good at home.

## 2020-05-04 NOTE — REASON FOR VISIT
[Follow-Up - From Hospitalization] : a follow-up visit after a recent hospitalization [Pulmonary Embolism] : pulmonary embolism

## 2020-05-04 NOTE — ASSESSMENT
[FreeTextEntry1] : Status post left leg DVT and bilateral pulmonary emboli resulting from corona virus 19 infection. Patient is likely hypercoagulable from the virus. Ultimately swab was negative.\par \par I recommend a total of 6 months of anticoagulation with Eliquis. I recommend a followup leg duplex in 3-4 months.\par \par If recommendations regarding anticoagulation and corona virus change, duration of anticoagulation should change accordingly. Follow up here as needed.

## 2021-04-12 ENCOUNTER — APPOINTMENT (OUTPATIENT)
Dept: DISASTER EMERGENCY | Facility: OTHER | Age: 61
End: 2021-04-12
Payer: COMMERCIAL

## 2021-04-12 PROCEDURE — 0012A: CPT

## 2021-10-19 NOTE — DISCHARGE NOTE NURSING/CASE MANAGEMENT/SOCIAL WORK - NSDCPETBCESMAN_GEN_ALL_CORE
[facility]   1947   [id number]       10/19/2021       I hereby authorize and direct Nino Samayoa MD, Gómez Patel, and whomever he may designate as his associate to perform upon myself the following procedure:    Injection of: Kenalog, Supartz, Euflexxa, Orthovisc in the Right/Left ____________________. If any unforeseen condition arises in the course of the procedure, I further authorize him and his associated and/or assistant(s) to do whatever he/she deems advisable. The nature, purpose, benefits, risks, side effects, likelihood of achieving goals, and potential problems that might occur during recuperation, risks for not receiving the proposed care, treatment and services and alternatives of the procedure have been fully explained to me by my physician including, but not limited to:    Swelling, joint pain, skin pigment changes, worsening of condition, and failure to improve. I acknowledge that no guarantee or assurance has been made to me as to the results that may be obtained or the likelihood of success.                 _______________________________________     Signature of patient or authorized representative                [employer] [facility] fax: [phone] If you are a smoker, it is important for your health to stop smoking. Please be aware that second hand smoke is also harmful.

## 2025-07-08 ENCOUNTER — OFFICE (OUTPATIENT)
Dept: URBAN - METROPOLITAN AREA CLINIC 114 | Facility: CLINIC | Age: 65
Setting detail: OPHTHALMOLOGY
End: 2025-07-08
Payer: COMMERCIAL

## 2025-07-08 DIAGNOSIS — H25.13: ICD-10-CM

## 2025-07-08 DIAGNOSIS — H43.393: ICD-10-CM

## 2025-07-08 DIAGNOSIS — H17.9: ICD-10-CM

## 2025-07-08 PROCEDURE — 92250 FUNDUS PHOTOGRAPHY W/I&R: CPT | Performed by: OPHTHALMOLOGY

## 2025-07-08 PROCEDURE — 92025 CPTRIZED CORNEAL TOPOGRAPHY: CPT | Performed by: OPHTHALMOLOGY

## 2025-07-08 PROCEDURE — 92004 COMPRE OPH EXAM NEW PT 1/>: CPT | Performed by: OPHTHALMOLOGY

## 2025-07-08 ASSESSMENT — CONFRONTATIONAL VISUAL FIELD TEST (CVF)
OS_FINDINGS: FULL
OD_FINDINGS: FULL

## 2025-07-09 ASSESSMENT — REFRACTION_CURRENTRX
OD_OVR_VA: 20/
OD_ADD: +2.00
OS_CYLINDER: -0.50
OS_OVR_VA: 20/
OS_AXIS: 088
OS_VPRISM_DIRECTION: PROGS
OD_VPRISM_DIRECTION: PROGS
OD_CYLINDER: -0.50
OS_SPHERE: +1.00
OD_SPHERE: +1.75
OS_ADD: +2.00
OD_AXIS: 097

## 2025-07-09 ASSESSMENT — KERATOMETRY
OS_K1POWER_DIOPTERS: 43.75
OD_K2POWER_DIOPTERS: 43.75
OS_AXISANGLE_DEGREES: 172
OS_K2POWER_DIOPTERS: 44.00
OD_AXISANGLE_DEGREES: 162
OD_K1POWER_DIOPTERS: 43.25

## 2025-07-09 ASSESSMENT — REFRACTION_MANIFEST
OS_ADD: +1.00
OD_ADD: +1.00
OU_VA: 20/20
OD_AXIS: 095
OD_VA1: 20/20-1
OD_VA2: 20/20
OD_SPHERE: +1.75
OS_CYLINDER: -0.50
OS_VA2: 20/20
OS_VA1: 20/20
OD_CYLINDER: -1.00
OS_SPHERE: PLANO
OS_AXIS: 070

## 2025-07-09 ASSESSMENT — VISUAL ACUITY
OD_BCVA: 20/20
OS_BCVA: 20/20-1

## 2025-07-09 ASSESSMENT — REFRACTION_AUTOREFRACTION
OD_CYLINDER: -1.25
OS_SPHERE: -0.25
OS_CYLINDER: -0.75
OS_AXIS: 072
OD_SPHERE: +1.75
OD_AXIS: 095

## 2025-07-18 ENCOUNTER — OFFICE (OUTPATIENT)
Dept: URBAN - METROPOLITAN AREA CLINIC 94 | Facility: CLINIC | Age: 65
Setting detail: OPHTHALMOLOGY
End: 2025-07-18
Payer: COMMERCIAL

## 2025-07-18 DIAGNOSIS — H25.11: ICD-10-CM

## 2025-07-18 DIAGNOSIS — H25.13: ICD-10-CM

## 2025-07-18 PROBLEM — H17.9 CORNEAL SCAR: Status: ACTIVE | Noted: 2025-07-08

## 2025-07-18 PROBLEM — H43.393 VITREOUS FLOATERS; BOTH EYES: Status: ACTIVE | Noted: 2025-07-08

## 2025-07-18 PROBLEM — H25.12 CATARACT SENILE NUCLEAR SCLEROSIS; RIGHT EYE, LEFT EYE, BOTH EYES: Status: ACTIVE | Noted: 2025-07-18

## 2025-07-18 PROCEDURE — 92014 COMPRE OPH EXAM EST PT 1/>: CPT | Performed by: OPHTHALMOLOGY

## 2025-07-18 PROCEDURE — 92136 OPHTHALMIC BIOMETRY: CPT | Performed by: OPHTHALMOLOGY

## 2025-07-18 PROCEDURE — 92136 OPHTHALMIC BIOMETRY: CPT | Mod: TC | Performed by: OPHTHALMOLOGY

## 2025-07-18 ASSESSMENT — KERATOMETRY
OS_AXISANGLE2_DEGREES: 80
OD_AXISANGLE2_DEGREES: 80
OS_K1POWER_DIOPTERS: 43.75
OS_K2POWER_DIOPTERS: 44.00
OD_AXISANGLE_DEGREES: 80
OD_K1POWER_DIOPTERS: 43.25
OS_CYLPOWER_DEGREES: 0.25
OD_CYLPOWER_DEGREES: 0.25
OS_CYLAXISANGLE_DEGREES: 80
OD_K1K2_AVERAGE: 43.375
OD_CYLAXISANGLE_DEGREES: 80
OD_K2POWER_DIOPTERS: 43.50
OS_AXISANGLE_DEGREES: 80
OS_K1K2_AVERAGE: 43.875

## 2025-07-18 ASSESSMENT — TONOMETRY: OS_IOP_MMHG: 10

## 2025-07-18 ASSESSMENT — CONFRONTATIONAL VISUAL FIELD TEST (CVF)
OD_FINDINGS: FULL
OS_FINDINGS: FULL

## 2025-07-30 ASSESSMENT — REFRACTION_MANIFEST
OS_AXIS: 070
OD_ADD: +1.00
OS_SPHERE: PLANO
OS_ADD: +1.00
OD_VA2: 20/20
OS_VA1: 20/20
OD_CYLINDER: -1.00
OD_VA1: 20/20-1
OU_VA: 20/20
OD_AXIS: 095
OS_VA2: 20/20
OS_CYLINDER: -0.50
OD_SPHERE: +1.75

## 2025-07-30 ASSESSMENT — KERATOMETRY
OS_AXISANGLE_DEGREES: 80
OS_K1POWER_DIOPTERS: 43.75
OS_K2POWER_DIOPTERS: 44.00
OD_K1POWER_DIOPTERS: 43.25
OD_AXISANGLE_DEGREES: 80
OD_K2POWER_DIOPTERS: 43.50

## 2025-07-30 ASSESSMENT — REFRACTION_CURRENTRX
OS_OVR_VA: 20/
OD_ADD: +2.00
OS_CYLINDER: -0.50
OS_AXIS: 088
OS_ADD: +2.00
OD_OVR_VA: 20/
OD_VPRISM_DIRECTION: PROGS
OD_AXIS: 097
OD_SPHERE: +1.75
OS_VPRISM_DIRECTION: PROGS
OD_CYLINDER: -0.50
OS_SPHERE: +1.00

## 2025-07-30 ASSESSMENT — VISUAL ACUITY
OD_BCVA: 20/30
OS_BCVA: 20/30

## 2025-07-30 ASSESSMENT — REFRACTION_AUTOREFRACTION
OS_AXIS: 097
OD_CYLINDER: -1.00
OD_AXIS: 097
OS_CYLINDER: -1.00
OD_SPHERE: +1.50
OS_SPHERE: +1.50

## 2025-08-07 ENCOUNTER — ASC (OUTPATIENT)
Dept: URBAN - METROPOLITAN AREA SURGERY 8 | Facility: SURGERY | Age: 65
Setting detail: OPHTHALMOLOGY
End: 2025-08-07
Payer: COMMERCIAL

## 2025-08-07 DIAGNOSIS — H25.12: ICD-10-CM

## 2025-08-07 DIAGNOSIS — H52.222: ICD-10-CM

## 2025-08-07 PROCEDURE — V2788P PANOPTIX: Performed by: OPHTHALMOLOGY

## 2025-08-07 PROCEDURE — FEMTO PRECISION LASER CATARACT SURGERY: Mod: GY | Performed by: OPHTHALMOLOGY

## 2025-08-07 PROCEDURE — 66984 XCAPSL CTRC RMVL W/O ECP: CPT | Mod: LT | Performed by: OPHTHALMOLOGY

## 2025-08-08 ENCOUNTER — RX ONLY (RX ONLY)
Age: 65
End: 2025-08-08

## 2025-08-08 ENCOUNTER — OFFICE (OUTPATIENT)
Dept: URBAN - METROPOLITAN AREA CLINIC 94 | Facility: CLINIC | Age: 65
Setting detail: OPHTHALMOLOGY
End: 2025-08-08
Payer: COMMERCIAL

## 2025-08-08 DIAGNOSIS — Z96.1: ICD-10-CM

## 2025-08-08 PROCEDURE — 99024 POSTOP FOLLOW-UP VISIT: CPT | Performed by: PHYSICIAN ASSISTANT

## 2025-08-08 ASSESSMENT — CONFRONTATIONAL VISUAL FIELD TEST (CVF)
OS_FINDINGS: FULL
OD_FINDINGS: FULL

## 2025-08-08 ASSESSMENT — CORNEAL EDEMA CLINICAL DESCRIPTION: OS_CORNEALEDEMA: T

## 2025-08-09 ASSESSMENT — REFRACTION_CURRENTRX
OD_OVR_VA: 20/
OS_VPRISM_DIRECTION: PROGS
OD_AXIS: 097
OD_ADD: +2.00
OS_AXIS: 088
OD_SPHERE: +1.75
OS_CYLINDER: -0.50
OS_ADD: +2.00
OS_OVR_VA: 20/
OD_CYLINDER: -0.50
OD_VPRISM_DIRECTION: PROGS
OS_SPHERE: +1.00

## 2025-08-09 ASSESSMENT — KERATOMETRY
OD_AXISANGLE_DEGREES: 090
OS_K1POWER_DIOPTERS: 44.25
OD_K2POWER_DIOPTERS: 43.50
OS_K2POWER_DIOPTERS: 44.75
OS_AXISANGLE_DEGREES: 047
OD_K1POWER_DIOPTERS: 43.50

## 2025-08-09 ASSESSMENT — REFRACTION_MANIFEST
OS_CYLINDER: -0.50
OD_VA2: 20/20
OU_VA: 20/20
OS_AXIS: 070
OD_VA1: 20/20-1
OS_SPHERE: PLANO
OD_ADD: +1.00
OD_CYLINDER: -1.00
OD_SPHERE: +1.75
OS_VA1: 20/20
OD_AXIS: 095
OS_ADD: +1.00
OS_VA2: 20/20

## 2025-08-09 ASSESSMENT — REFRACTION_AUTOREFRACTION
OD_AXIS: 094
OS_SPHERE: -0.25
OS_CYLINDER: -0.75
OS_AXIS: 114
OD_CYLINDER: -0.50
OD_SPHERE: +1.25

## 2025-08-09 ASSESSMENT — VISUAL ACUITY
OD_BCVA: 20/40+1
OS_BCVA: 20/25

## 2025-08-12 ENCOUNTER — OFFICE (OUTPATIENT)
Dept: URBAN - METROPOLITAN AREA CLINIC 114 | Facility: CLINIC | Age: 65
Setting detail: OPHTHALMOLOGY
End: 2025-08-12
Payer: COMMERCIAL

## 2025-08-12 DIAGNOSIS — Z96.1: ICD-10-CM

## 2025-08-12 DIAGNOSIS — H25.11: ICD-10-CM

## 2025-08-12 PROCEDURE — 92136 OPHTHALMIC BIOMETRY: CPT | Performed by: OPHTHALMOLOGY

## 2025-08-12 PROCEDURE — 99024 POSTOP FOLLOW-UP VISIT: CPT | Performed by: OPHTHALMOLOGY

## 2025-08-12 ASSESSMENT — REFRACTION_CURRENTRX
OS_SPHERE: +1.00
OD_SPHERE: +1.75
OS_AXIS: 088
OD_OVR_VA: 20/
OS_CYLINDER: -0.50
OD_AXIS: 097
OD_CYLINDER: -0.50
OS_VPRISM_DIRECTION: PROGS
OD_ADD: +2.00
OS_OVR_VA: 20/
OS_ADD: +2.00
OD_VPRISM_DIRECTION: PROGS

## 2025-08-12 ASSESSMENT — REFRACTION_AUTOREFRACTION
OS_CYLINDER: -0.25
OD_CYLINDER: -0.75
OD_SPHERE: +1.50
OD_AXIS: 095
OS_AXIS: 010
OS_SPHERE: 0.00

## 2025-08-12 ASSESSMENT — KERATOMETRY
OS_K2POWER_DIOPTERS: 43.50
OS_AXISANGLE_DEGREES: 117
OD_K2POWER_DIOPTERS: 43.50
OD_AXISANGLE_DEGREES: 173
OS_K1POWER_DIOPTERS: 43.25
OD_K1POWER_DIOPTERS: 43.00

## 2025-08-12 ASSESSMENT — REFRACTION_MANIFEST
OD_VA1: 20/20-1
OS_VA2: 20/20
OD_VA2: 20/20
OS_ADD: +1.00
OD_ADD: +1.00
OD_AXIS: 095
OS_SPHERE: PLANO
OS_VA1: 20/20
OU_VA: 20/20
OS_AXIS: 070
OD_SPHERE: +1.75
OS_CYLINDER: -0.50
OD_CYLINDER: -1.00

## 2025-08-12 ASSESSMENT — CONFRONTATIONAL VISUAL FIELD TEST (CVF)
OS_FINDINGS: FULL
OD_FINDINGS: FULL

## 2025-08-12 ASSESSMENT — VISUAL ACUITY
OS_BCVA: 20/20
OD_BCVA: 20/25

## 2025-08-12 ASSESSMENT — CORNEAL EDEMA CLINICAL DESCRIPTION: OS_CORNEALEDEMA: T
